# Patient Record
Sex: FEMALE | Race: WHITE | NOT HISPANIC OR LATINO | Employment: PART TIME | URBAN - NONMETROPOLITAN AREA
[De-identification: names, ages, dates, MRNs, and addresses within clinical notes are randomized per-mention and may not be internally consistent; named-entity substitution may affect disease eponyms.]

---

## 2022-01-21 ENCOUNTER — OFFICE VISIT (OUTPATIENT)
Dept: INTERNAL MEDICINE | Facility: CLINIC | Age: 27
End: 2022-01-21

## 2022-01-21 VITALS
HEIGHT: 62 IN | BODY MASS INDEX: 36.29 KG/M2 | SYSTOLIC BLOOD PRESSURE: 120 MMHG | HEART RATE: 107 BPM | RESPIRATION RATE: 16 BRPM | OXYGEN SATURATION: 96 % | DIASTOLIC BLOOD PRESSURE: 82 MMHG | WEIGHT: 197.2 LBS | TEMPERATURE: 97 F

## 2022-01-21 DIAGNOSIS — F32.A ANXIETY AND DEPRESSION: ICD-10-CM

## 2022-01-21 DIAGNOSIS — R63.5 WEIGHT GAIN, ABNORMAL: Primary | ICD-10-CM

## 2022-01-21 DIAGNOSIS — Z30.41 VISIT FOR BIRTH CONTROL PILLS MAINTENANCE: ICD-10-CM

## 2022-01-21 DIAGNOSIS — Z11.59 NEED FOR HEPATITIS C SCREENING TEST: ICD-10-CM

## 2022-01-21 DIAGNOSIS — F41.9 ANXIETY AND DEPRESSION: ICD-10-CM

## 2022-01-21 DIAGNOSIS — R53.83 FATIGUE, UNSPECIFIED TYPE: ICD-10-CM

## 2022-01-21 PROCEDURE — 99204 OFFICE O/P NEW MOD 45 MIN: CPT | Performed by: FAMILY MEDICINE

## 2022-01-21 RX ORDER — MULTIPLE VITAMINS W/ MINERALS TAB 9MG-400MCG
1 TAB ORAL DAILY
COMMUNITY

## 2022-01-21 RX ORDER — BUPROPION HYDROCHLORIDE 150 MG/1
150 TABLET ORAL EVERY MORNING
Qty: 90 TABLET | Refills: 3 | Status: SHIPPED | OUTPATIENT
Start: 2022-01-21

## 2022-01-21 RX ORDER — NORGESTIMATE AND ETHINYL ESTRADIOL 7DAYSX3 28
1 KIT ORAL DAILY
Qty: 84 TABLET | Refills: 3 | Status: SHIPPED | OUTPATIENT
Start: 2022-01-21

## 2022-01-21 NOTE — PROGRESS NOTES
"Subjective    Shaniqua Oliveira is a 26 y.o. female here for:  Chief Complaint   Patient presents with   • Thyroid Problem     establish care, discuss getting thyroid labs   • Contraception     pt is currently on the pill, for about 10 years       History per MA reviewed.    Thyroid issues?  Something \"off\" x last year  Gaining weight, not doing anything different  Estimates about 30 lb   Has stretch marks and those are now  Thighs, arms, sides  Always tired  In grad school  No libido  Wasn't like that about a year ago.   On oral contraceptive pill for 10 years  Regular periods, not too heavy    All through college was 130-140 lb.     Anxiety and depression  Was on Trintellix which helped with depression, not so much her anxiety  Has tried/failed: Zoloft, Paxil, Lexapro, prozac         The following portions of the patient's history were reviewed and updated as appropriate: allergies, current medications, past family history, past medical history, past social history, past surgical history and problem list.    Review of Systems   Constitutional: Positive for fatigue and unexpected weight gain.   Eyes:        Dry eyes   Musculoskeletal: Positive for arthralgias.   Skin: Positive for skin lesions (stretch marks on thighs, flank, arms).   Psychiatric/Behavioral: Negative for suicidal ideas.        Sleep disturbances, anxiety, depressed, change in personality, sadness.     All other systems reviewed and are negative.        Objective   Visit Vitals  /82 (BP Location: Right arm, Patient Position: Sitting, Cuff Size: Adult)   Pulse 107   Temp 97 °F (36.1 °C) (Temporal)   Resp 16   Ht 157.5 cm (62\")   Wt 89.4 kg (197 lb 3.2 oz)   SpO2 96%   BMI 36.07 kg/m²       Physical Exam  Vitals and nursing note reviewed.   Constitutional:       General: She is not in acute distress.     Appearance: Normal appearance. She is well-developed and well-groomed. She is obese. She is not ill-appearing, toxic-appearing or diaphoretic.      " Interventions: Face mask in place.   HENT:      Head: Normocephalic and atraumatic.      Right Ear: Hearing normal.      Left Ear: Hearing normal.   Eyes:      General: Lids are normal. No scleral icterus.     Extraocular Movements: Extraocular movements intact.   Neck:      Trachea: Phonation normal.   Pulmonary:      Effort: Pulmonary effort is normal.   Musculoskeletal:      Cervical back: Neck supple.   Skin:     Coloration: Skin is not jaundiced or pale.      Comments: Stretch marks on right flank   Neurological:      General: No focal deficit present.      Mental Status: She is alert and oriented to person, place, and time.      Motor: Motor function is intact.   Psychiatric:         Attention and Perception: Attention and perception normal.         Mood and Affect: Mood and affect normal.         Speech: Speech normal.         Behavior: Behavior normal. Behavior is cooperative.         Thought Content: Thought content normal.         Cognition and Memory: Cognition and memory normal.         Judgment: Judgment normal.         Assessment/Plan     Problem List Items Addressed This Visit        Mental Health    Anxiety and depression    Overview     Has tried/failed: Zoloft, Paxil, Lexapro, prozac         Relevant Medications    buPROPion XL (Wellbutrin XL) 150 MG 24 hr tablet      Other Visit Diagnoses     Weight gain, abnormal    -  Primary    Relevant Orders    TSH+Free T4    Triiodothyronine (T3) Total & Free    T4    Thyroid Antibodies    ACTH    Cortisol - AM    CBC (No Diff)    Comprehensive Metabolic Panel    Hemoglobin A1c    Vitamin B12    Folate    Vitamin D 25 Hydroxy    Fatigue, unspecified type        Relevant Orders    TSH+Free T4    Triiodothyronine (T3) Total & Free    T4    Thyroid Antibodies    ACTH    Cortisol - AM    CBC (No Diff)    Comprehensive Metabolic Panel    Hemoglobin A1c    Vitamin B12    Folate    Vitamin D 25 Hydroxy    Need for hepatitis C screening test        Relevant Orders     Hepatitis C Antibody    Visit for birth control pills maintenance        Relevant Medications    norgestimate-ethinyl estradiol (Tri-Sprintec) 0.18/0.215/0.25 MG-35 MCG per tablet          · Trial Wellbutrin for anxiety/depression, may also help with focus and/or weight loss. Stop if any si/hi    Return in about 4 weeks (around 2/18/2022) for follow up.     Telma Mcbride MD

## 2022-01-24 DIAGNOSIS — R79.9 ABNORMAL BLOOD CHEMISTRY: Primary | ICD-10-CM

## 2022-01-24 LAB
25(OH)D3+25(OH)D2 SERPL-MCNC: 29.3 NG/ML (ref 30–100)
ACTH PLAS-MCNC: 7.8 PG/ML (ref 7.2–63.3)
ALBUMIN SERPL-MCNC: 4.5 G/DL (ref 3.9–5)
ALBUMIN/GLOB SERPL: 1.4 {RATIO} (ref 1.2–2.2)
ALP SERPL-CCNC: 72 IU/L (ref 44–121)
ALT SERPL-CCNC: 15 IU/L (ref 0–32)
AST SERPL-CCNC: 13 IU/L (ref 0–40)
BILIRUB SERPL-MCNC: 0.2 MG/DL (ref 0–1.2)
BUN SERPL-MCNC: 14 MG/DL (ref 6–20)
BUN/CREAT SERPL: 22 (ref 9–23)
CALCIUM SERPL-MCNC: 9.4 MG/DL (ref 8.7–10.2)
CHLORIDE SERPL-SCNC: 102 MMOL/L (ref 96–106)
CO2 SERPL-SCNC: 24 MMOL/L (ref 20–29)
CORTIS AM PEAK SERPL-MCNC: 19.8 UG/DL (ref 6.2–19.4)
CREAT SERPL-MCNC: 0.64 MG/DL (ref 0.57–1)
ERYTHROCYTE [DISTWIDTH] IN BLOOD BY AUTOMATED COUNT: 12.7 % (ref 11.7–15.4)
FOLATE SERPL-MCNC: 18.7 NG/ML
GLOBULIN SER CALC-MCNC: 3.2 G/DL (ref 1.5–4.5)
GLUCOSE SERPL-MCNC: 77 MG/DL (ref 65–99)
HBA1C MFR BLD: 5.1 % (ref 4.8–5.6)
HCT VFR BLD AUTO: 40.7 % (ref 34–46.6)
HCV AB S/CO SERPL IA: <0.1 S/CO RATIO (ref 0–0.9)
HGB BLD-MCNC: 14.1 G/DL (ref 11.1–15.9)
MCH RBC QN AUTO: 30.5 PG (ref 26.6–33)
MCHC RBC AUTO-ENTMCNC: 34.6 G/DL (ref 31.5–35.7)
MCV RBC AUTO: 88 FL (ref 79–97)
PLATELET # BLD AUTO: 467 X10E3/UL (ref 150–450)
POTASSIUM SERPL-SCNC: 4.2 MMOL/L (ref 3.5–5.2)
PROT SERPL-MCNC: 7.7 G/DL (ref 6–8.5)
RBC # BLD AUTO: 4.63 X10E6/UL (ref 3.77–5.28)
SODIUM SERPL-SCNC: 140 MMOL/L (ref 134–144)
T3 SERPL-MCNC: 165 NG/DL (ref 71–180)
T3FREE SERPL-MCNC: 3.2 PG/ML (ref 2–4.4)
T4 FREE SERPL-MCNC: 0.97 NG/DL (ref 0.82–1.77)
T4 SERPL-MCNC: 9.8 UG/DL (ref 4.5–12)
THYROGLOB AB SERPL-ACNC: <1 IU/ML (ref 0–0.9)
THYROPEROXIDASE AB SERPL-ACNC: 9 IU/ML (ref 0–34)
TSH SERPL-ACNC: 1.46 UIU/ML (ref 0.45–4.5)
VIT B12 SERPL-MCNC: 401 PG/ML (ref 232–1245)
WBC # BLD AUTO: 11 X10E3/UL (ref 3.4–10.8)

## 2023-04-12 ENCOUNTER — LAB (OUTPATIENT)
Dept: LAB | Facility: HOSPITAL | Age: 28
End: 2023-04-12
Payer: MEDICAID

## 2023-04-12 ENCOUNTER — OFFICE VISIT (OUTPATIENT)
Dept: INTERNAL MEDICINE | Facility: CLINIC | Age: 28
End: 2023-04-12
Payer: MEDICAID

## 2023-04-12 VITALS
WEIGHT: 191 LBS | DIASTOLIC BLOOD PRESSURE: 82 MMHG | HEART RATE: 89 BPM | BODY MASS INDEX: 35.15 KG/M2 | SYSTOLIC BLOOD PRESSURE: 118 MMHG | OXYGEN SATURATION: 99 % | HEIGHT: 62 IN

## 2023-04-12 DIAGNOSIS — F41.9 ANXIETY: ICD-10-CM

## 2023-04-12 DIAGNOSIS — Z71.3 ENCOUNTER FOR DIETARY COUNSELING AND SURVEILLANCE: ICD-10-CM

## 2023-04-12 DIAGNOSIS — Z76.89 ESTABLISHING CARE WITH NEW DOCTOR, ENCOUNTER FOR: ICD-10-CM

## 2023-04-12 DIAGNOSIS — Z13.220 SCREENING FOR LIPID DISORDERS: ICD-10-CM

## 2023-04-12 DIAGNOSIS — Z23 NEED FOR VACCINATION: ICD-10-CM

## 2023-04-12 DIAGNOSIS — Z01.82 ENCOUNTER FOR ALLERGY TESTING: ICD-10-CM

## 2023-04-12 DIAGNOSIS — E66.09 CLASS 1 OBESITY DUE TO EXCESS CALORIES WITHOUT SERIOUS COMORBIDITY WITH BODY MASS INDEX (BMI) OF 34.0 TO 34.9 IN ADULT: ICD-10-CM

## 2023-04-12 DIAGNOSIS — R14.0 BLOATING: ICD-10-CM

## 2023-04-12 DIAGNOSIS — L70.8 OTHER ACNE: ICD-10-CM

## 2023-04-12 DIAGNOSIS — G47.9 TROUBLE IN SLEEPING: ICD-10-CM

## 2023-04-12 DIAGNOSIS — Z76.89 ESTABLISHING CARE WITH NEW DOCTOR, ENCOUNTER FOR: Primary | ICD-10-CM

## 2023-04-12 DIAGNOSIS — J34.2 DEVIATED SEPTUM: ICD-10-CM

## 2023-04-12 DIAGNOSIS — Z13.29 SCREENING FOR THYROID DISORDER: ICD-10-CM

## 2023-04-12 DIAGNOSIS — Z13.1 SCREENING FOR DIABETES MELLITUS: ICD-10-CM

## 2023-04-12 DIAGNOSIS — Z83.3 FAMILY HISTORY OF DIABETES MELLITUS: ICD-10-CM

## 2023-04-12 DIAGNOSIS — Z01.419 ENCOUNTER FOR WELL WOMAN EXAM: ICD-10-CM

## 2023-04-12 DIAGNOSIS — Z82.61 FAMILY HISTORY OF RHEUMATOID ARTHRITIS: ICD-10-CM

## 2023-04-12 LAB
ALBUMIN SERPL-MCNC: 4.3 G/DL (ref 3.5–5.2)
ALBUMIN/GLOB SERPL: 1.3 G/DL
ALP SERPL-CCNC: 68 U/L (ref 39–117)
ALT SERPL W P-5'-P-CCNC: 9 U/L (ref 1–33)
ANION GAP SERPL CALCULATED.3IONS-SCNC: 10 MMOL/L (ref 5–15)
AST SERPL-CCNC: 16 U/L (ref 1–32)
BILIRUB SERPL-MCNC: 0.2 MG/DL (ref 0–1.2)
BILIRUB UR QL STRIP: NEGATIVE
BUN SERPL-MCNC: 10 MG/DL (ref 6–20)
BUN/CREAT SERPL: 14.1 (ref 7–25)
CALCIUM SPEC-SCNC: 9.3 MG/DL (ref 8.6–10.5)
CHLORIDE SERPL-SCNC: 104 MMOL/L (ref 98–107)
CHOLEST SERPL-MCNC: 175 MG/DL (ref 0–200)
CLARITY UR: ABNORMAL
CO2 SERPL-SCNC: 24 MMOL/L (ref 22–29)
COLOR UR: YELLOW
CREAT SERPL-MCNC: 0.71 MG/DL (ref 0.57–1)
DEPRECATED RDW RBC AUTO: 38.1 FL (ref 37–54)
EGFRCR SERPLBLD CKD-EPI 2021: 121.2 ML/MIN/1.73
ERYTHROCYTE [DISTWIDTH] IN BLOOD BY AUTOMATED COUNT: 12.2 % (ref 12.3–15.4)
GLOBULIN UR ELPH-MCNC: 3.4 GM/DL
GLUCOSE SERPL-MCNC: 64 MG/DL (ref 65–99)
GLUCOSE UR STRIP-MCNC: NEGATIVE MG/DL
HBA1C MFR BLD: 5.6 % (ref 4.8–5.6)
HCT VFR BLD AUTO: 38.6 % (ref 34–46.6)
HDLC SERPL-MCNC: 58 MG/DL (ref 40–60)
HGB BLD-MCNC: 13.6 G/DL (ref 12–15.9)
HGB UR QL STRIP.AUTO: NEGATIVE
KETONES UR QL STRIP: ABNORMAL
LDLC SERPL CALC-MCNC: 98 MG/DL (ref 0–100)
LDLC/HDLC SERPL: 1.64 {RATIO}
LEUKOCYTE ESTERASE UR QL STRIP.AUTO: NEGATIVE
MCH RBC QN AUTO: 30.3 PG (ref 26.6–33)
MCHC RBC AUTO-ENTMCNC: 35.2 G/DL (ref 31.5–35.7)
MCV RBC AUTO: 86 FL (ref 79–97)
NITRITE UR QL STRIP: NEGATIVE
PH UR STRIP.AUTO: 6 [PH] (ref 5–8)
PLATELET # BLD AUTO: 372 10*3/MM3 (ref 140–450)
PMV BLD AUTO: 10.2 FL (ref 6–12)
POTASSIUM SERPL-SCNC: 3.9 MMOL/L (ref 3.5–5.2)
PROT SERPL-MCNC: 7.7 G/DL (ref 6–8.5)
PROT UR QL STRIP: ABNORMAL
RBC # BLD AUTO: 4.49 10*6/MM3 (ref 3.77–5.28)
SODIUM SERPL-SCNC: 138 MMOL/L (ref 136–145)
SP GR UR STRIP: 1.03 (ref 1–1.03)
TRIGL SERPL-MCNC: 108 MG/DL (ref 0–150)
TSH SERPL DL<=0.05 MIU/L-ACNC: 2.04 UIU/ML (ref 0.27–4.2)
UROBILINOGEN UR QL STRIP: ABNORMAL
VLDLC SERPL-MCNC: 19 MG/DL (ref 5–40)
WBC NRBC COR # BLD: 9.14 10*3/MM3 (ref 3.4–10.8)

## 2023-04-12 PROCEDURE — 86003 ALLG SPEC IGE CRUDE XTRC EA: CPT

## 2023-04-12 PROCEDURE — 80061 LIPID PANEL: CPT

## 2023-04-12 PROCEDURE — 36415 COLL VENOUS BLD VENIPUNCTURE: CPT

## 2023-04-12 PROCEDURE — 83036 HEMOGLOBIN GLYCOSYLATED A1C: CPT

## 2023-04-12 PROCEDURE — 80050 GENERAL HEALTH PANEL: CPT

## 2023-04-12 PROCEDURE — 81003 URINALYSIS AUTO W/O SCOPE: CPT

## 2023-04-12 RX ORDER — HYDROXYZINE HYDROCHLORIDE 10 MG/1
10 TABLET, FILM COATED ORAL 3 TIMES DAILY PRN
Qty: 90 TABLET | Refills: 0 | Status: SHIPPED | OUTPATIENT
Start: 2023-04-12 | End: 2023-05-12

## 2023-04-12 RX ORDER — BUSPIRONE HYDROCHLORIDE 5 MG/1
5 TABLET ORAL 3 TIMES DAILY
Qty: 90 TABLET | Refills: 0 | Status: SHIPPED | OUTPATIENT
Start: 2023-04-12 | End: 2023-05-12

## 2023-04-12 NOTE — PROGRESS NOTES
"    Office Note     Name: Shaniqua Oliveira    : 1995     MRN: [id number]     Chief Complaint  Establish Care and referrals     Subjective     History of Present Illness:  Shaniqua Oliveira is a 27 y.o. female who presents today for establish care with new provider    Patient presents today requesting a number of referrals as well as concern for anxiety.    Dermatology: Patient thought she was over acne but she is continuing to have breakouts.  This is mainly on her face.  She denies any breakouts on her body.  She is currently in grad school and does understand that she is probably not eating the best diet or exercising regularly and does understand this is all connected.  She would appreciate a referral  Allergy: Patient is requesting a referral to allergy for testing.  She feels she could possibly have a allergy to either dairy or gluten.  She continues to feel bloated especially after eating those 2 items.  She does consistently burp.  She will also occasionally feel nauseated after eating those items as well.  ENT: Patient states that she had a COVID test completed at a different location and was told she had a deviated septum.  She does note snoring as well as nasal congestion.  She has never been told she has a deviated septum.  She would like this further evaluated  Women's health: Patient would appreciate a referral to women's health for Pap smears.    Anxiety: Patient states that she does have \"bad anxiety.  She also does not sleep very well.  She does understand that she is in grad school and probably not as healthy as she should be.  She reports feeling very anxious.  No suicidal or homicidal ideation.  She has tried Wellbutrin in the past but it has increased her anxiety.  She plateaued on Trintellix.  She did have a bad reaction to Zoloft and also had side effects to Paxil.    Patient requested fourth COVID vaccination    Patient is a non-smoker.  No excessive alcohol intake.  No drug use.    Patient " describes her diet is unhealthy.  Denies any particular exercise pattern    Vaccines:  Flu shot in 2022  Patient has had 3 COVID vaccines and requested her fourth today  Patient did have an injury last year in July and did receive a tetanus shot.  This was documented today  She has had 2 of the 3 HPV vaccines    Patient states it has been quite a long time since her last Pap smear    Family history includes a mother with RA on Remicade.  Diabetes in her dad and sister.  There is noted issues with the eyes and multiple family members    Patient does prefer morning appointments on Mondays or Fridays    Review of Systems   Constitutional: Negative for fatigue and fever.        Establish care   Respiratory: Negative for cough.    Cardiovascular: Negative for chest pain.   Musculoskeletal: Negative for arthralgias.   Allergic/Immunologic: Negative for immunocompromised state.   Neurological: Negative for headaches.   Psychiatric/Behavioral: Negative for suicidal ideas. The patient is not nervous/anxious.        Past Medical History:   Diagnosis Date   • Anxiety 2019   • Depression 2013   • Irritable bowel syndrome 2012       Past Surgical History:   Procedure Laterality Date   • COLONOSCOPY  2011       Social History     Socioeconomic History   • Marital status: Single   Tobacco Use   • Smoking status: Never   • Smokeless tobacco: Never   Vaping Use   • Vaping Use: Never used   Substance and Sexual Activity   • Alcohol use: Not Currently   • Drug use: Never   • Sexual activity: Not Currently     Partners: Male     Birth control/protection: Condom, Pill     Comment: Oral birth control         Current Outpatient Medications:   •  norgestimate-ethinyl estradiol (Tri-Sprintec) 0.18/0.215/0.25 MG-35 MCG per tablet, Take 1 tablet by mouth Daily., Disp: 84 tablet, Rfl: 3  •  busPIRone (BUSPAR) 5 MG tablet, Take 1 tablet by mouth 3 (Three) Times a Day for 30 days., Disp: 90 tablet, Rfl: 0  •  hydrOXYzine (ATARAX) 10 MG tablet,  "Take 1 tablet by mouth 3 (Three) Times a Day As Needed for Itching for up to 30 days., Disp: 90 tablet, Rfl: 0    Objective     Vital Signs  /82   Pulse 89   Ht 157.5 cm (62\")   Wt 86.6 kg (191 lb)   SpO2 99%   BMI 34.93 kg/m²   Estimated body mass index is 34.93 kg/m² as calculated from the following:    Height as of this encounter: 157.5 cm (62\").    Weight as of this encounter: 86.6 kg (191 lb).    BMI is >= 30 and <35. (Class 1 Obesity). The following options were offered after discussion;: exercise counseling/recommendations and nutrition counseling/recommendations      PHQ-9 Depression Screening  Little interest or pleasure in doing things? 0-->not at all   Feeling down, depressed, or hopeless? 0-->not at all   Trouble falling or staying asleep, or sleeping too much?     Feeling tired or having little energy?     Poor appetite or overeating?     Feeling bad about yourself - or that you are a failure or have let yourself or your family down?     Trouble concentrating on things, such as reading the newspaper or watching television?     Moving or speaking so slowly that other people could have noticed? Or the opposite - being so fidgety or restless that you have been moving around a lot more than usual?     Thoughts that you would be better off dead, or of hurting yourself in some way?     PHQ-9 Total Score 0   If you checked off any problems, how difficult have these problems made it for you to do your work, take care of things at home, or get along with other people?       PHQ-9 Total Score: 0         Physical Exam  Vitals and nursing note reviewed.   Constitutional:       General: She is awake.      Appearance: Normal appearance. She is well-groomed. She is obese.   HENT:      Head: Normocephalic and atraumatic.      Right Ear: Hearing and external ear normal.      Left Ear: Hearing and external ear normal.      Nose: Septal deviation present.      Mouth/Throat:      Lips: Pink.      Mouth: Mucous " membranes are moist.   Eyes:      Extraocular Movements: Extraocular movements intact.      Pupils: Pupils are equal, round, and reactive to light.   Cardiovascular:      Rate and Rhythm: Normal rate and regular rhythm.      Pulses: Normal pulses.           Radial pulses are 2+ on the right side and 2+ on the left side.        Posterior tibial pulses are 2+ on the right side and 2+ on the left side.      Heart sounds: Normal heart sounds, S1 normal and S2 normal.   Pulmonary:      Effort: Pulmonary effort is normal.      Breath sounds: Normal breath sounds.   Abdominal:      General: Bowel sounds are normal.      Palpations: Abdomen is soft.   Musculoskeletal:         General: Normal range of motion.      Cervical back: Normal range of motion.   Skin:     General: Skin is warm and dry.   Neurological:      Mental Status: She is alert and oriented to person, place, and time.   Psychiatric:         Mood and Affect: Mood normal.         Behavior: Behavior normal. Behavior is cooperative.         Thought Content: Thought content normal.         Judgment: Judgment normal.               Assessment and Plan     Diagnoses and all orders for this visit:    1. Establishing care with new doctor, encounter for (Primary)  -     CBC (No Diff); Future  -     Comprehensive Metabolic Panel; Future  -     Urinalysis With Culture If Indicated -; Future    2. Other acne  -     Ambulatory Referral to Dermatology    3. Bloating  -     Allergen Food Panel; Future  -     Allergens (33) Environmental; Future  -     Ambulatory Referral to Allergy    4. Encounter for allergy testing  -     Allergen Food Panel; Future  -     Allergens (33) Environmental; Future  -     Ambulatory Referral to Allergy    5. Deviated septum  -     Ambulatory Referral to ENT (Otolaryngology)    6. Encounter for well woman exam  -     Ambulatory Referral to Obstetrics / Gynecology    7. Anxiety  -     busPIRone (BUSPAR) 5 MG tablet; Take 1 tablet by mouth 3 (Three)  Times a Day for 30 days.  Dispense: 90 tablet; Refill: 0    8. Trouble in sleeping  -     hydrOXYzine (ATARAX) 10 MG tablet; Take 1 tablet by mouth 3 (Three) Times a Day As Needed for Itching for up to 30 days.  Dispense: 90 tablet; Refill: 0    9. Need for vaccination  -     COVID-19 Bivalent Booster (Pfizer) 12+yrs    10. Screening for lipid disorders  -     Lipid Panel; Future    11. Screening for thyroid disorder  -     TSH Rfx On Abnormal To Free T4; Future    12. Screening for diabetes mellitus  -     Hemoglobin A1c; Future    13. Family history of rheumatoid arthritis    14. Family history of diabetes mellitus    15. Class 1 obesity due to excess calories without serious comorbidity with body mass index (BMI) of 34.0 to 34.9 in adult    16. Encounter for dietary counseling and surveillance    Plan  Very pleasant visit with patient today    Referral placed to dermatology regarding issues with acne on her face    Referral placed to allergy regarding concern for possible dairy or gluten allergy    Referral placed to ENT regarding deviated septum    Referral placed to women's health for updated Pap smears    Labs were ordered and will be obtained today.  Patient will be notified of results.  Patient is aware that I will be out of town next week so she does have additional questions regarding labs we will plan to follow-up at her next visit    Anxiety and trouble sleeping: Patient will be started on BuSpar at 5 mg.  She will start by taking this once daily for about 5 to 7 days.  She will increase to twice daily after that.  If she does feel that she needs to take it 3 times a day she is welcome to.  She will start taking hydroxyzine at night to help with sleep.  We did discuss this can also be taken during the day up to 3 times per day.  We did discuss side effects of each medication.    Fourth COVID-vaccine was provided today    Patient will consider getting the third HPV vaccination at next appointment    Go to  ER if any condition worsens or severe    Consider starting on a healthy diet and exercise pattern    Plan to follow-up in 4 weeks for annual visit, lab review, referral review, consideration of HPV vaccination.  Patient does prefer morning appointments on Monday or Friday    Follow Up  Return for annnual visit and lab review in 4 weeks- 45 minutes- morning appt, mon or fri.    NICHOLAS Gaona    Part of this note may be an electronic transcription/translation of spoken language to printed text using the Dragon Dictation System.

## 2023-04-13 ENCOUNTER — TELEPHONE (OUTPATIENT)
Dept: INTERNAL MEDICINE | Facility: CLINIC | Age: 28
End: 2023-04-13
Payer: MEDICAID

## 2023-04-13 NOTE — TELEPHONE ENCOUNTER
----- Message from NICHOLAS Gaona sent at 4/13/2023  8:23 AM EDT -----  Please let patient know labs resulted.  We are still waiting on the allergen testing to return.  I know I told her yesterday that could take some time.  If it does result while I am out of town next week you are welcome to review as it has a fairly good layout of how to interpret.  Liver numbers, kidney numbers, electrolytes are within normal limits.  Your blood sugar was slightly low on lab work.  Urinalysis with minor abnormalities.  No evidence of infection or anemia  Thyroid within normal limits  Lipid panel looks great  A1c is 5.6.  No evidence of prediabetes or diabetes

## 2023-04-15 LAB
A ALTERNATA IGE QN: <0.1 KU/L
A FUMIGATUS IGE QN: <0.1 KU/L
AMER ROACH IGE QN: <0.1 KU/L
BERMUDA GRASS IGE QN: <0.1 KU/L
BOXELDER IGE QN: <0.1 KU/L
C ALBICANS IGE QN: <0.1 KU/L
C HERBARUM IGE QN: <0.1 KU/L
C SPECIFERA IGE QN: <0.1 KU/L
CARELESS WEED IGE QN: <0.1 KU/L
CAT DANDER IGE QN: 4.57 KU/L
COCKLEBUR IGE QN: <0.1 KU/L
COMMON RAGWEED IGE QN: 0.39 KU/L
CONV CLASS DESCRIPTION: ABNORMAL
COW DANDER IGE QN: <0.1 KU/L
D FARINAE IGE QN: <0.1 KU/L
D PTERONYSS IGE QN: <0.1 KU/L
DOG DANDER IGE QN: 0.24 KU/L
EAST WHITE PINE IGE QN: <0.1 KU/L
ENGL PLANTAIN IGE QN: 4.63 KU/L
GOOSE FEATHER IGE QN: <0.1 KU/L
GOOSEFOOT IGE QN: <0.1 KU/L
HORSE EPITH IGE QN: <0.1 KU/L
HOUSE DUST HS IGE QN: 0.41 KU/L
JOHNSON GRASS IGE QN: 0.31 KU/L
KENT BLUE GRASS IGE QN: 0.88 KU/L
LONDON PLANE IGE QN: <0.1 KU/L
MT JUNIPER IGE QN: 14.3 KU/L
P NOTATUM IGE QN: <0.1 KU/L
S ROSTRATA IGE QN: <0.1 KU/L
SHEEP SORREL IGE QN: <0.1 KU/L
VIRG LIVE OAK IGE QN: <0.1 KU/L
WHITE ASH IGE QN: 0.61 KU/L
WHITE ELM IGE QN: <0.1 KU/L
WHITE HICKORY IGE QN: 0.13 KU/L

## 2023-04-16 LAB
BARLEY IGE QN: <0.1 KU/L
BEEF IGE QN: <0.1 KU/L
CABBAGE IGE QN: <0.1 KU/L
CARROT IGE QN: <0.1 KU/L
CHICKEN MEAT IGE QN: <0.1 KU/L
CODFISH IGE QN: <0.1 KU/L
CONV CLASS DESCRIPTION: NORMAL
CORN IGE QN: <0.1 KU/L
COW MILK IGE QN: <0.1 KU/L
CRAB IGE QN: <0.1 KU/L
EGG WHITE IGE QN: <0.1 KU/L
GRAPE IGE QN: <0.1 KU/L
GREEN PEPPER IGE QN: <0.1 KU/L
LETTUCE IGE QN: <0.1 KU/L
OAT IGE QN: <0.1 KU/L
ORANGE IGE QN: <0.1 KU/L
PEANUT IGE QN: <0.1 KU/L
PORK IGE QN: <0.1 KU/L
POTATO IGE QN: <0.1 KU/L
RICE IGE QN: <0.1 KU/L
RYE IGE QN: <0.1 KU/L
SHRIMP IGE QN: <0.1 KU/L
SOYBEAN IGE QN: <0.1 KU/L
TOMATO IGE QN: <0.1 KU/L
TUNA IGE QN: <0.1 KU/L
WHEAT IGE QN: <0.1 KU/L
WHITE BEAN IGE QN: <0.1 KU/L

## 2023-04-20 ENCOUNTER — TELEPHONE (OUTPATIENT)
Dept: INTERNAL MEDICINE | Facility: CLINIC | Age: 28
End: 2023-04-20
Payer: MEDICAID

## 2023-04-20 NOTE — TELEPHONE ENCOUNTER
----- Message from NICHOLAS Salazar sent at 4/19/2023  8:31 PM EDT -----  Your allergy testing is now resulted. Follow up with allergy specialist as planned. Let us know if you have any specific questions/ concerns before that time.

## 2023-05-04 ENCOUNTER — OFFICE VISIT (OUTPATIENT)
Dept: INTERNAL MEDICINE | Facility: CLINIC | Age: 28
End: 2023-05-04
Payer: MEDICAID

## 2023-05-04 VITALS
HEIGHT: 62 IN | DIASTOLIC BLOOD PRESSURE: 78 MMHG | WEIGHT: 192 LBS | SYSTOLIC BLOOD PRESSURE: 124 MMHG | OXYGEN SATURATION: 99 % | BODY MASS INDEX: 35.33 KG/M2 | HEART RATE: 98 BPM

## 2023-05-04 DIAGNOSIS — Z71.3 ENCOUNTER FOR DIETARY COUNSELING AND SURVEILLANCE: ICD-10-CM

## 2023-05-04 DIAGNOSIS — F32.A ANXIETY AND DEPRESSION: ICD-10-CM

## 2023-05-04 DIAGNOSIS — G47.9 TROUBLE IN SLEEPING: ICD-10-CM

## 2023-05-04 DIAGNOSIS — E66.09 CLASS 2 OBESITY DUE TO EXCESS CALORIES WITHOUT SERIOUS COMORBIDITY WITH BODY MASS INDEX (BMI) OF 35.0 TO 35.9 IN ADULT: ICD-10-CM

## 2023-05-04 DIAGNOSIS — Z00.00 ENCOUNTER FOR WELL ADULT EXAM WITHOUT ABNORMAL FINDINGS: ICD-10-CM

## 2023-05-04 DIAGNOSIS — L70.8 OTHER ACNE: ICD-10-CM

## 2023-05-04 DIAGNOSIS — Z00.00 ANNUAL PHYSICAL EXAM: Primary | ICD-10-CM

## 2023-05-04 DIAGNOSIS — Z83.3 FAMILY HISTORY OF DIABETES MELLITUS: ICD-10-CM

## 2023-05-04 DIAGNOSIS — J34.2 DEVIATED SEPTUM: ICD-10-CM

## 2023-05-04 DIAGNOSIS — K58.8 OTHER IRRITABLE BOWEL SYNDROME: ICD-10-CM

## 2023-05-04 DIAGNOSIS — Z30.41 VISIT FOR BIRTH CONTROL PILLS MAINTENANCE: ICD-10-CM

## 2023-05-04 DIAGNOSIS — Z78.9 NON-SMOKER: ICD-10-CM

## 2023-05-04 DIAGNOSIS — F41.9 ANXIETY AND DEPRESSION: ICD-10-CM

## 2023-05-04 DIAGNOSIS — Z82.0 FAMILY HISTORY OF MEMORY LOSS: ICD-10-CM

## 2023-05-04 DIAGNOSIS — R14.0 BLOATING: ICD-10-CM

## 2023-05-04 RX ORDER — NORGESTIMATE AND ETHINYL ESTRADIOL 7DAYSX3 28
1 KIT ORAL DAILY
Qty: 84 TABLET | Refills: 0 | Status: SHIPPED | OUTPATIENT
Start: 2023-05-04

## 2023-05-04 RX ORDER — BUSPIRONE HYDROCHLORIDE 10 MG/1
10 TABLET ORAL 2 TIMES DAILY
Qty: 60 TABLET | Refills: 0 | Status: SHIPPED | OUTPATIENT
Start: 2023-05-04 | End: 2023-06-03

## 2023-05-04 NOTE — PROGRESS NOTES
Annual Physical     Name: Shaniqua Oliveira    : 1995     MRN: [id number]     Chief Complaint  Annual Exam    Subjective     History of Present Illness:  Shaniqua Oliveira is a 27 y.o. female who presents today for annual physical exam.    Anxiety and depression: At previous visit patient was started on BuSpar and hydroxyzine.  She states that hydroxyzine has been very helpful.  She has not slept this consistently in some time.  She reports this last month has been very stressful and she did see her therapist who recommended she consider starting on an SSRI.  She states she does have both a diagnosis of major depression and generalized anxiety disorder.  She feels like it is a mix between both.  Currently she is very anxious for school but she will have episodes of situational depression due to her mom's health issues.  She has tried Zoloft in the past and reports that side effects were intolerable.  She has tried Trintellix but felt she plateaued on this.  She has tried other medication but those were the 2 specifically that she mentioned.  No suicidal or homicidal ideation.  Depression screen completed at last visit    Patient does have upcoming appointments with referrals from previous visit  Dermatology is in July  The allergist will be next week  ENT was last week.  Patient was not very pleased with this visit as she felt the nurse and the doctor were very dismissive.  He gave her Flonase and an antibiotic cream to use for about 1 month.  She does have a follow-up in 1 month as well for consideration of further scanning.  She states that she is slightly stuck as this is the only office who will accept Medicaid.  [facility] [employer] is in August    No falls or ER visits in the last year    Patient is a non-smoker.  No excessive alcohol intake.  No drug use.    Patient does wear her seatbelts in the car and has smoke detectors at home    Significant family history does include a mother with vision issues and memory  "loss/beginning stages of dementia.  Father with type 2 diabetes and sister with type 1 diabetes    She is requesting a refill of her birth control prior to seeing women's health    Most recent lab work was also reviewed today    The patient is being seen for a health maintenance evaluation.    Past Medical History:   Diagnosis Date   • Anxiety 2019   • Depression 2013   • Irritable bowel syndrome 2012       Past Surgical History:   Procedure Laterality Date   • COLONOSCOPY  2011       Social History     Socioeconomic History   • Marital status: Single   Tobacco Use   • Smoking status: Never   • Smokeless tobacco: Never   Vaping Use   • Vaping Use: Never used   Substance and Sexual Activity   • Alcohol use: Not Currently   • Drug use: Never   • Sexual activity: Not Currently     Partners: Male     Birth control/protection: Condom, Pill     Comment: Oral birth control         Current Outpatient Medications:   •  hydrOXYzine (ATARAX) 10 MG tablet, Take 1 tablet by mouth 3 (Three) Times a Day As Needed for Itching for up to 30 days., Disp: 90 tablet, Rfl: 0  •  norgestimate-ethinyl estradiol (Tri-Sprintec) 0.18/0.215/0.25 MG-35 MCG per tablet, Take 1 tablet by mouth Daily., Disp: 84 tablet, Rfl: 0  •  busPIRone (BUSPAR) 10 MG tablet, Take 1 tablet by mouth 2 (Two) Times a Day for 30 days., Disp: 60 tablet, Rfl: 0    General History  Shaniqua  does have regular dental visits.  She does not complain of vision problems. Last eye exam was none recent.  Immunizations are up to date. The patient needs the following immunizations: Completion of HPV vaccination series will be completed today.  She has completed her COVID vaccination series.  Last tetanus shot was in 2022.    Lifestyle  Shaniqua  consumes in general, an \"unhealthy\" diet.  She exercises intermittently.    Reproductive Health  Shaniqua  is premenopausal.  She reports periods are regular every 28-30 days.  She is not sexually active. Her contraceptive plan is oral " contraceptives (estrogen/progesterone).    Screening  Last pap was -upcoming appointment with OB/GYN in August  Last Completed Pap Smear     This patient has no relevant Health Maintenance data.      . History of abnormal pap smear or family history of gyn cancer: None      Last mammogram was around age 23.  Patient does report she had breast lumps that were evaluated.  She denies any reported suggestions for follow-up  Last Completed Mammogram     This patient has no relevant Health Maintenance data.      . Personal or family history of abnormal mammograms or breast cancer: None stated    Last colonoscopy was a few years ago.  She was diagnosed with IBS when she was younger.  Denies any recommendations for follow-up  Last Completed Colonoscopy     This patient has no relevant Health Maintenance data.      . Family history of colon cancer: None stated    Last DEXA was never.    Advance Care Planning   ACP discussion was held with the patient during this visit. Patient does not have an advance directive, declines further assistance.       Health Maintenance Summary          Overdue - PAP SMEAR (Every 3 Years) Overdue - never done    No completion, postpone, or frequency change history exists for this topic.          INFLUENZA VACCINE (Yearly - August to March) Next due on 8/1/2023 12/04/2022  Imm Admin: Flu Vaccine Split Quad    12/04/2022  Imm Admin: Flublok 18+yrs    11/26/2021  Imm Admin: Flu Vaccine Quad PF >36MO    11/26/2021  Imm Admin: FluLaval/Fluzone >6mos    01/24/2019  Imm Admin: Flu Vaccine Quad PF >36MO    Only the first 5 history entries have been loaded, but more history exists.          ANNUAL PHYSICAL (Yearly) Next due on 5/4/2024 05/04/2023  Done          TDAP/TD VACCINES (3 - Td or Tdap) Next due on 7/14/2032 07/14/2022  Imm Admin: Tdap    04/06/2012  Imm Admin: Tdap          HEPATITIS C SCREENING  Completed    01/21/2022  Hep C Virus Ab component of Hepatitis C Antibody          COVID-19  "Vaccine (Series Information) Completed    04/12/2023  Imm Admin: COVID-19 (PFIZER) BIVALENT 12+YRS    11/26/2021  Imm Admin: COVID-19 (PFIZER) Purple Cap Monovalent    01/21/2021  Imm Admin: COVID-19 (PFIZER) Purple Cap Monovalent    01/01/2021  Imm Admin: COVID-19 (PFIZER) Purple Cap Monovalent          HPV VACCINES (Series Information) Completed    05/04/2023  Imm Admin: Hpv9    03/10/2021  Imm Admin: Hpv9    10/24/2020  Imm Admin: Hpv9          Pneumococcal Vaccine 0-64 (Series Information) Aged Out    No completion, postpone, or frequency change history exists for this topic.              Immunization History   Administered Date(s) Administered   • COVID-19 (PFIZER) BIVALENT 12+YRS 04/12/2023   • COVID-19 (PFIZER) Purple Cap Monovalent 01/01/2021, 01/21/2021, 11/26/2021   • DTP / HiB 1995, 01/26/1998, 10/01/1998   • DTaP, Unspecified 06/18/2002   • Flu Vaccine Quad PF >36MO 01/24/2019, 11/26/2021   • Flu Vaccine Split Quad 12/04/2022   • FluLaval/Fluzone >6mos 01/24/2019, 11/26/2021   • Flublok 18+yrs 12/04/2022   • Hep B, Adolescent or Pediatric 1995, 07/07/1998   • Hpv9 10/24/2020, 03/10/2021, 05/04/2023   • IPV 1995, 06/18/2002   • MCV4 Unspecified 06/01/2015   • MMR 10/01/1998, 06/18/2002   • Meningococcal MCV4P (Menactra) 06/01/2015   • OPV 10/01/1998   • Tdap 04/06/2012, 07/14/2022   • Varicella 06/18/2002, 06/01/2015       Review of Systems    Objective     Vital Signs  /78   Pulse 98   Ht 157.5 cm (62\")   Wt 87.1 kg (192 lb)   SpO2 99%   BMI 35.12 kg/m²   Estimated body mass index is 35.12 kg/m² as calculated from the following:    Height as of this encounter: 157.5 cm (62\").    Weight as of this encounter: 87.1 kg (192 lb).    Class 2 Severe Obesity (BMI >=35 and <=39.9). Obesity-related health conditions include the following: none. Obesity is unchanged. BMI is is above average; BMI management plan is completed. We discussed portion control and increasing exercise.       "     Physical Exam  Vitals and nursing note reviewed.   Constitutional:       Appearance: Normal appearance.   HENT:      Head: Normocephalic and atraumatic.   Eyes:      Extraocular Movements: Extraocular movements intact.      Pupils: Pupils are equal, round, and reactive to light.   Cardiovascular:      Rate and Rhythm: Normal rate and regular rhythm.      Pulses: Normal pulses.           Radial pulses are 2+ on the right side and 2+ on the left side.        Posterior tibial pulses are 2+ on the right side and 2+ on the left side.      Heart sounds: Normal heart sounds, S1 normal and S2 normal.   Pulmonary:      Effort: Pulmonary effort is normal.      Breath sounds: Normal breath sounds.   Abdominal:      General: Bowel sounds are normal.      Palpations: Abdomen is soft.   Musculoskeletal:         General: Normal range of motion.   Skin:     General: Skin is warm and dry.   Neurological:      Mental Status: She is alert and oriented to person, place, and time.   Psychiatric:         Mood and Affect: Mood normal.         Behavior: Behavior normal.         Thought Content: Thought content normal.         Judgment: Judgment normal.          Lab Review:   Latest Reference Range & Units 04/12/23 08:09   Glucose 65 - 99 mg/dL 64 (L)   Sodium 136 - 145 mmol/L 138   Potassium 3.5 - 5.2 mmol/L 3.9   CO2 22.0 - 29.0 mmol/L 24.0   Chloride 98 - 107 mmol/L 104   Anion Gap 5.0 - 15.0 mmol/L 10.0   Creatinine 0.57 - 1.00 mg/dL 0.71   BUN 6 - 20 mg/dL 10   BUN/Creatinine Ratio 7.0 - 25.0  14.1   Calcium 8.6 - 10.5 mg/dL 9.3   eGFR >60.0 mL/min/1.73 121.2   Alkaline Phosphatase 39 - 117 U/L 68   Total Protein 6.0 - 8.5 g/dL 7.7   ALT (SGPT) 1 - 33 U/L 9   AST (SGOT) 1 - 32 U/L 16   Total Bilirubin 0.0 - 1.2 mg/dL 0.2   Albumin 3.5 - 5.2 g/dL 4.3   Globulin gm/dL 3.4   A/G Ratio g/dL 1.3   Hemoglobin A1C 4.80 - 5.60 % 5.60   TSH Baseline 0.270 - 4.200 uIU/mL 2.040   Total Cholesterol 0 - 200 mg/dL 175   HDL Cholesterol 40 - 60  mg/dL 58   LDL Cholesterol  0 - 100 mg/dL 98   VLDL Cholesterol 5 - 40 mg/dL 19   Triglycerides 0 - 150 mg/dL 108   LDL/HDL Ratio  1.64   WBC 3.40 - 10.80 10*3/mm3 9.14   RBC 3.77 - 5.28 10*6/mm3 4.49   Hemoglobin 12.0 - 15.9 g/dL 13.6   Hematocrit 34.0 - 46.6 % 38.6   RDW 12.3 - 15.4 % 12.2 (L)   MCV 79.0 - 97.0 fL 86.0   MCH 26.6 - 33.0 pg 30.3   MCHC 31.5 - 35.7 g/dL 35.2   MPV 6.0 - 12.0 fL 10.2   Platelets 140 - 450 10*3/mm3 372   RDW-SD 37.0 - 54.0 fl 38.1   Yuri Grass Class 0/I kU/L 0.31 !   Color, UA Yellow, Straw  Yellow   Appearance, UA Clear  Hazy !   Specific Gravity, UA 1.005 - 1.030  1.026   pH, UA 5.0 - 8.0  6.0   Glucose Negative  Negative   Ketones, UA Negative  Trace !   Blood, UA Negative  Negative   Nitrite, UA Negative  Negative   Leukocytes, UA Negative  Negative   Protein, UA Negative  Trace !   Bilirubin, UA Negative  Negative   Urobilinogen, UA 0.2 - 1.0 E.U./dL  0.2 E.U./dL   Cat Dander Class IV kU/L 4.57 !   Cow Dander Class 0 kU/L <0.10   Dog Dander, IgE Class 0/I kU/L 0.24 !   Goose Feathers Class 0 kU/L <0.10   Horse Dander Class 0 kU/L <0.10   Barley Class 0 kU/L <0.10   Beef Class 0 kU/L <0.10   Cabbage Class 0 kU/L <0.10   Carrot Class 0 kU/L <0.10   Chicken Class 0 kU/L <0.10   Codfish Class 0 kU/L <0.10   Corn Class 0 kU/L <0.10   Crab Class 0 kU/L <0.10   Egg White Class 0 kU/L <0.10   English Plantain Class IV kU/L 4.63 !   Grape Class 0 kU/L <0.10   Green Bell Pepper Class 0 kU/L <0.10   Lettuce Class 0 kU/L <0.10   Milk, Cow's Class 0 kU/L <0.10   Oats Class 0 kU/L <0.10   Orange Class 0 kU/L <0.10   Peanut Class 0 kU/L <0.10   Pork Class 0 kU/L <0.10   Potato Class 0 kU/L <0.10   Rice Class 0 kU/L <0.10   Rye Class 0 kU/L <0.10   Shrimp Class 0 kU/L <0.10   Soybean Class 0 kU/L <0.10   Tomato Class 0 kU/L <0.10   Tuna Class 0 kU/L <0.10   Wheat Class 0 kU/L <0.10   White Bean Class 0 kU/L <0.10   Berm[place] Grass Class 0 kU/L <0.10   [facility] Blue[facility] IgE Class II kU/L 0.88 !    Sheep Sorrel Class 0 kU/L <0.10   Cockroach, American Class 0 kU/L <0.10   D. farinae (dust mite) Class 0 kU/L <0.10   D. pteronyssinus (dust mite) Class 0 kU/L <0.10   Class Description  Comment  Comment   Aspergillus fumigatus Class 0 kU/L <0.10   Bipolaris spicifera Class 0 kU/L <0.10   CANDIDA ALBICANS Class 0 kU/L <0.10   Cladosporium herbarum Class 0 kU/L <0.10   Setomelanomma rostrat Class 0 kU/L <0.10   Shakeel, White Class II kU/L 0.61 !   Coleman, Mountain Class IV kU/L 14.30 !   Elm, American Class 0 kU/L <0.10   Greenlee, White Class 0/I kU/L 0.13 !   Maple/Broome Class 0 kU/L <0.10   Maple [place] Class 0 kU/L <0.10   Braddock, Live/[place] Class 0 kU/L <0.10   [place], White Class 0 kU/L <0.10   Careless Weed Class 0 kU/L <0.10   Cocklebur Class 0 kU/L <0.10   Lamb's Quarter Class 0 kU/L <0.10   Ragweed, Common/Short Class I kU/L 0.39 !   Penicillium chrysogen Class 0 kU/L <0.10   House Dust, Mary Class I kU/L 0.41 !   (L): Data is abnormally low  !: Data is abnormal         Assessment and Plan     Diagnoses and all orders for this visit:    1. Annual physical exam (Primary)  -     HPV Vaccine (HPV9)    2. Encounter for well adult exam without abnormal findings    3. Anxiety and depression  -     busPIRone (BUSPAR) 10 MG tablet; Take 1 tablet by mouth 2 (Two) Times a Day for 30 days.  Dispense: 60 tablet; Refill: 0    4. Trouble in sleeping    5. Other acne    6. Deviated septum    7. Other irritable bowel syndrome    8. Bloating    9. Visit for birth control pills maintenance  -     norgestimate-ethinyl estradiol (Tri-Sprintec) 0.18/0.215/0.25 MG-35 MCG per tablet; Take 1 tablet by mouth Daily.  Dispense: 84 tablet; Refill: 0    10. Family history of diabetes mellitus    11. Family history of memory loss    12. Non-smoker    13. Class 2 obesity due to excess calories without serious comorbidity with body mass index (BMI) of 35.0 to 35.9 in adult    14. Encounter for dietary counseling and  surveillance    Plan  Very pleasant visit with patient today for her annual physical exam    Refill of birth control provided for patient up until her next appointment with women's health    Regarding her anxiety and depression, we will start with increasing her BuSpar to 10 mg twice daily.  Patient will try this over the next 2 weeks.  She was also provided a sample of Viibryd.  In 2 weeks she will consider continuing with the BuSpar or adding on the Viibryd.  We will plan to follow-up in 4 weeks for further discussion    We did review previous lab work.  Patient denied any further questions    Please keep upcoming appointment with dermatology    Keep upcoming appointment with allergist    Keep upcoming appointment with ENT    Keep upcoming appointment with women's health    Continue with non-smoking status    Consider healthy lifestyle    Go to ER if any condition worsens or severe    Follow-up in 4 weeks for telehealth for medication review.    Follow-up 1 year for annual visit    Follow Up  Return for 4 wk telelheath med review and 1 year for annual visit.    NICHOLAS Gaona    Part of this note may be an electronic transcription/translation of spoken language to printed text using the Dragon Dictation System.

## 2023-05-10 ENCOUNTER — LAB (OUTPATIENT)
Dept: LAB | Facility: HOSPITAL | Age: 28
End: 2023-05-10
Payer: MEDICAID

## 2023-05-10 ENCOUNTER — TRANSCRIBE ORDERS (OUTPATIENT)
Dept: LAB | Facility: HOSPITAL | Age: 28
End: 2023-05-10
Payer: MEDICAID

## 2023-05-10 DIAGNOSIS — R19.7 DIARRHEA OF PRESUMED INFECTIOUS ORIGIN: ICD-10-CM

## 2023-05-10 DIAGNOSIS — R19.7 DIARRHEA OF PRESUMED INFECTIOUS ORIGIN: Primary | ICD-10-CM

## 2023-05-10 LAB — IGA1 MFR SER: 160 MG/DL (ref 70–400)

## 2023-05-10 PROCEDURE — 86258 DGP ANTIBODY EACH IG CLASS: CPT

## 2023-05-10 PROCEDURE — 86364 TISS TRNSGLTMNASE EA IG CLAS: CPT

## 2023-05-10 PROCEDURE — 82784 ASSAY IGA/IGD/IGG/IGM EACH: CPT

## 2023-05-10 PROCEDURE — 36415 COLL VENOUS BLD VENIPUNCTURE: CPT

## 2023-05-10 PROCEDURE — 86256 FLUORESCENT ANTIBODY TITER: CPT

## 2023-05-11 LAB
GLIADIN PEPTIDE IGA SER-ACNC: 4 UNITS (ref 0–19)
TTG IGA SER-ACNC: <2 U/ML (ref 0–3)

## 2023-05-15 LAB
IGA ANTI-ENDOMYSIAL AB: <2.5 TITER
IGG ANTI-ENDOMYSIAL AB: <2.5 TITER
Lab: NORMAL
PATHOLOGIST NAME: NORMAL
RESULT: NORMAL
SUBSTRATE: NORMAL

## 2023-05-31 ENCOUNTER — TELEMEDICINE (OUTPATIENT)
Dept: INTERNAL MEDICINE | Facility: CLINIC | Age: 28
End: 2023-05-31

## 2023-05-31 DIAGNOSIS — Z09 FOLLOW-UP EXAM: Primary | ICD-10-CM

## 2023-05-31 DIAGNOSIS — F41.9 ANXIETY AND DEPRESSION: ICD-10-CM

## 2023-05-31 DIAGNOSIS — F32.A ANXIETY AND DEPRESSION: ICD-10-CM

## 2023-05-31 RX ORDER — BUSPIRONE HYDROCHLORIDE 10 MG/1
10 TABLET ORAL 2 TIMES DAILY
Qty: 60 TABLET | Refills: 0 | Status: SHIPPED | OUTPATIENT
Start: 2023-05-31 | End: 2023-06-30

## 2023-05-31 RX ORDER — ESCITALOPRAM OXALATE 10 MG/1
10 TABLET ORAL DAILY
Qty: 30 TABLET | Refills: 0 | Status: SHIPPED | OUTPATIENT
Start: 2023-05-31 | End: 2023-06-30

## 2023-05-31 NOTE — PROGRESS NOTES
Telehealth Visit     Date: 2023   Patient Name: Shaniqua Oliveira  : 1995   MRN: [id number]     Chief Complaint:    Chief Complaint   Patient presents with   • Anxiety   • Depression       This provider is located at the [facility] in [place], [place]. The patient is being seen remotely via telehealth at their home address in [place], and stated they are in a secure environment for this session. The patient's condition being diagnosed/treated is appropriate for telemedicine. The provider identified herself as well as her credentials. The patient, and/or patients guardian, consent to be seen remotely, and when consent is given they understand that the consent allows for patient identifiable information to be sent to a third party as needed. They may refuse to be seen remotely at any time. The electronic data is encrypted and password protected, and the patient and/or guardian has been advised of the potential risks to privacy not withstanding such measures.    You have chosen to receive care through a telehealth visit. Do you consent to use a video/audio connection for your medical care today? Yes    History of Present Illness: Shaniqua Oliveira is a 27 y.o. female who is here today to follow up with anxiety and depression medication.    At previous visit we discussed her anxiety and depression.  The hydroxyzine has been very helpful related to sleep.  She was recently started on BuSpar.  We did increase the dose at last visit to 10 mg twice daily.  She is currently seeing a therapist who also recommended she consider starting on an SSRI.  She states she has both a diagnosis of major depression and generalized anxiety disorder.  She feels like she is a mix between both her anxiety and depression symptoms.  Patient was provided a sample of rye bread at previous visit.  She is not able to tolerate this medication as it has also caused her significant reactions of nausea vomiting and her skin  feeling like pins-and-needles.  She is interested in adding an additional medication on to assist with her symptoms.    She has tried Zoloft in the past with intolerable side effects  She has tried Trintellix but plateaued on that medication        Subjective      Review of Systems:   Review of Systems   Constitutional: Negative for chills, fatigue and fever.        Follow up   HENT: Negative for sore throat.    Eyes: Negative for visual disturbance.   Respiratory: Negative for cough and shortness of breath.    Cardiovascular: Negative for chest pain.   Gastrointestinal: Negative for abdominal pain.   Skin: Negative for color change.   Allergic/Immunologic: Negative for immunocompromised state.   Neurological: Negative for headaches.   Psychiatric/Behavioral: Negative for behavioral problems.       I have reviewed and the following portions of the patient's history were updated as appropriate: past family history, past medical history, past social history, past surgical history and problem list.    Medications:     Current Outpatient Medications:   •  busPIRone (BUSPAR) 10 MG tablet, Take 1 tablet by mouth 2 (Two) Times a Day for 30 days. Call before refill to let me know how you are doing, Disp: 60 tablet, Rfl: 0  •  norgestimate-ethinyl estradiol (Tri-Sprintec) 0.18/0.215/0.25 MG-35 MCG per tablet, Take 1 tablet by mouth Daily., Disp: 84 tablet, Rfl: 0  •  escitalopram (Lexapro) 10 MG tablet, Take 1 tablet by mouth Daily for 30 days., Disp: 30 tablet, Rfl: 0    Allergies:   No Known Allergies    Objective     Physical Exam:  Vital Signs: There were no vitals filed for this visit.  There is no height or weight on file to calculate BMI.           Physical Exam  Constitutional:       Appearance: Normal appearance.   HENT:      Head: Normocephalic and atraumatic.   Eyes:      Extraocular Movements: Extraocular movements intact.   Pulmonary:      Effort: No respiratory distress.   Skin:     General: Skin is dry.    Neurological:      Mental Status: She is alert.   Psychiatric:         Mood and Affect: Mood normal.         Behavior: Behavior normal.         Assessment / Plan      Assessment/Plan:   Diagnoses and all orders for this visit:    1. Follow-up exam (Primary)    2. Anxiety and depression  -     busPIRone (BUSPAR) 10 MG tablet; Take 1 tablet by mouth 2 (Two) Times a Day for 30 days. Call before refill to let me know how you are doing  Dispense: 60 tablet; Refill: 0  -     escitalopram (Lexapro) 10 MG tablet; Take 1 tablet by mouth Daily for 30 days.  Dispense: 30 tablet; Refill: 0    Plan  Discussed with patient that we will keep her BuSpar at 10 mg twice daily    Patient has now tried and failed the following:  Zoloft  Trintellix  Viibryd    We will add on Lexapro 10 mg once daily.  I will send in a 30-day prescription and patient will call the office and notify me how she is doing before the end of her prescription.  We will consider further plan of care at that time    Go to ER if any condition worsens or severe    We will plan to follow-up in 6 months for chronic conditions but patient will keep me updated before that time    Follow Up:   Return for 6 month follow up for chronic conditions.    Any medications prescribed have been sent electronically to   [facility] [address] [place] [address] [phone]  - [phone] [place] [address]  Phone: [phone] Fax: [phone]      15 minutes were spent reviewing the patient's questionnaire, formulating a treatment plan, and relaying information to the patient via Prompt.ly.    NICHOLAS Gaona    Part of this note may be an electronic transcription/translation of spoken language to printed text using the Dragon Dictation System.

## 2023-07-25 ENCOUNTER — TELEPHONE (OUTPATIENT)
Dept: INTERNAL MEDICINE | Facility: CLINIC | Age: 28
End: 2023-07-25

## 2023-07-25 NOTE — TELEPHONE ENCOUNTER
Caller: Shaniqua Oliveira    Relationship: Self    Best call back number:[phone]        What specialty or service is being requested: GASTROENTEROLOGIST       Any additional details: THE PATIENT WOULD LIKE TO GET AN UPDATE ON THE REFERRAL SHE WOULD LIKE TO KNOW IF THAT WAS COMPLEATED

## 2023-08-01 ENCOUNTER — OFFICE VISIT (OUTPATIENT)
Dept: OBSTETRICS AND GYNECOLOGY | Facility: CLINIC | Age: 28
End: 2023-08-01
Payer: MEDICAID

## 2023-08-01 ENCOUNTER — PATIENT ROUNDING (BHMG ONLY) (OUTPATIENT)
Dept: OBSTETRICS AND GYNECOLOGY | Facility: CLINIC | Age: 28
End: 2023-08-01
Payer: MEDICAID

## 2023-08-01 VITALS
DIASTOLIC BLOOD PRESSURE: 82 MMHG | BODY MASS INDEX: 33.2 KG/M2 | HEIGHT: 62 IN | SYSTOLIC BLOOD PRESSURE: 108 MMHG | WEIGHT: 180.4 LBS

## 2023-08-01 DIAGNOSIS — Z30.41 VISIT FOR BIRTH CONTROL PILLS MAINTENANCE: ICD-10-CM

## 2023-08-01 DIAGNOSIS — Z01.419 WOMEN'S ANNUAL ROUTINE GYNECOLOGICAL EXAMINATION: Primary | ICD-10-CM

## 2023-08-01 RX ORDER — PHENTERMINE HYDROCHLORIDE 37.5 MG/1
TABLET ORAL
COMMUNITY
Start: 2023-06-28

## 2023-08-01 RX ORDER — NORGESTIMATE AND ETHINYL ESTRADIOL 7DAYSX3 28
1 KIT ORAL DAILY
Qty: 84 TABLET | Refills: 3 | Status: SHIPPED | OUTPATIENT
Start: 2023-08-01

## 2023-08-03 LAB — REF LAB TEST METHOD: NORMAL

## 2023-08-25 ENCOUNTER — TELEPHONE (OUTPATIENT)
Dept: INTERNAL MEDICINE | Facility: CLINIC | Age: 28
End: 2023-08-25
Payer: MEDICAID

## 2023-08-25 NOTE — TELEPHONE ENCOUNTER
Caller: Shaniqua Oliveira    Relationship: Self    Best call back number: [phone]       What was the call regarding: PATIENT WOULD LIKE ALL VACCINATIONS WE HAVE ON FILE FOR HER MADE AVAILABLE TO HER [facility] [employer] SO SHE CAN DOWNLOAD THEM.     Is it okay if the provider responds through [employer]: YES          
HUB TO READ:  ALEAH that vaccine record should be available on [email or web address].  
no

## 2023-10-11 ENCOUNTER — OFFICE VISIT (OUTPATIENT)
Dept: GASTROENTEROLOGY | Facility: CLINIC | Age: 28
End: 2023-10-11
Payer: MEDICAID

## 2023-10-11 ENCOUNTER — LAB (OUTPATIENT)
Dept: LAB | Facility: HOSPITAL | Age: 28
End: 2023-10-11
Payer: MEDICAID

## 2023-10-11 VITALS
HEART RATE: 106 BPM | BODY MASS INDEX: 31.76 KG/M2 | RESPIRATION RATE: 18 BRPM | TEMPERATURE: 98.4 F | WEIGHT: 172.6 LBS | HEIGHT: 62 IN | SYSTOLIC BLOOD PRESSURE: 122 MMHG | DIASTOLIC BLOOD PRESSURE: 72 MMHG | OXYGEN SATURATION: 97 %

## 2023-10-11 DIAGNOSIS — R11.2 NAUSEA AND VOMITING, UNSPECIFIED VOMITING TYPE: ICD-10-CM

## 2023-10-11 DIAGNOSIS — R10.10 UPPER ABDOMINAL PAIN: ICD-10-CM

## 2023-10-11 DIAGNOSIS — Z79.1 NSAID LONG-TERM USE: ICD-10-CM

## 2023-10-11 DIAGNOSIS — K21.9 GASTROESOPHAGEAL REFLUX DISEASE, UNSPECIFIED WHETHER ESOPHAGITIS PRESENT: Primary | ICD-10-CM

## 2023-10-11 DIAGNOSIS — K21.9 GASTROESOPHAGEAL REFLUX DISEASE, UNSPECIFIED WHETHER ESOPHAGITIS PRESENT: ICD-10-CM

## 2023-10-11 LAB
ALBUMIN SERPL-MCNC: 4.2 G/DL (ref 3.5–5.2)
ALBUMIN/GLOB SERPL: 1.4 G/DL
ALP SERPL-CCNC: 60 U/L (ref 39–117)
ALT SERPL W P-5'-P-CCNC: 13 U/L (ref 1–33)
AMYLASE SERPL-CCNC: 55 U/L (ref 28–100)
ANION GAP SERPL CALCULATED.3IONS-SCNC: 10.8 MMOL/L (ref 5–15)
AST SERPL-CCNC: 14 U/L (ref 1–32)
BASOPHILS # BLD AUTO: 0.02 10*3/MM3 (ref 0–0.2)
BASOPHILS NFR BLD AUTO: 0.3 % (ref 0–1.5)
BILIRUB SERPL-MCNC: 0.4 MG/DL (ref 0–1.2)
BUN SERPL-MCNC: 11 MG/DL (ref 6–20)
BUN/CREAT SERPL: 16.9 (ref 7–25)
CALCIUM SPEC-SCNC: 9.3 MG/DL (ref 8.6–10.5)
CHLORIDE SERPL-SCNC: 106 MMOL/L (ref 98–107)
CO2 SERPL-SCNC: 24.2 MMOL/L (ref 22–29)
CREAT SERPL-MCNC: 0.65 MG/DL (ref 0.57–1)
CRP SERPL-MCNC: 1.14 MG/DL (ref 0–0.5)
DEPRECATED RDW RBC AUTO: 39.5 FL (ref 37–54)
EGFRCR SERPLBLD CKD-EPI 2021: 124.7 ML/MIN/1.73
EOSINOPHIL # BLD AUTO: 0.12 10*3/MM3 (ref 0–0.4)
EOSINOPHIL NFR BLD AUTO: 1.6 % (ref 0.3–6.2)
ERYTHROCYTE [DISTWIDTH] IN BLOOD BY AUTOMATED COUNT: 12.3 % (ref 12.3–15.4)
GLOBULIN UR ELPH-MCNC: 3.1 GM/DL
GLUCOSE SERPL-MCNC: 70 MG/DL (ref 65–99)
HCT VFR BLD AUTO: 38 % (ref 34–46.6)
HGB BLD-MCNC: 13 G/DL (ref 12–15.9)
IMM GRANULOCYTES # BLD AUTO: 0.02 10*3/MM3 (ref 0–0.05)
IMM GRANULOCYTES NFR BLD AUTO: 0.3 % (ref 0–0.5)
LIPASE SERPL-CCNC: 27 U/L (ref 13–60)
LYMPHOCYTES # BLD AUTO: 2.93 10*3/MM3 (ref 0.7–3.1)
LYMPHOCYTES NFR BLD AUTO: 39.4 % (ref 19.6–45.3)
MCH RBC QN AUTO: 30 PG (ref 26.6–33)
MCHC RBC AUTO-ENTMCNC: 34.2 G/DL (ref 31.5–35.7)
MCV RBC AUTO: 87.6 FL (ref 79–97)
MONOCYTES # BLD AUTO: 0.41 10*3/MM3 (ref 0.1–0.9)
MONOCYTES NFR BLD AUTO: 5.5 % (ref 5–12)
NEUTROPHILS NFR BLD AUTO: 3.94 10*3/MM3 (ref 1.7–7)
NEUTROPHILS NFR BLD AUTO: 52.9 % (ref 42.7–76)
NRBC BLD AUTO-RTO: 0 /100 WBC (ref 0–0.2)
PLATELET # BLD AUTO: 380 10*3/MM3 (ref 140–450)
PMV BLD AUTO: 10.2 FL (ref 6–12)
POTASSIUM SERPL-SCNC: 3.9 MMOL/L (ref 3.5–5.2)
PROT SERPL-MCNC: 7.3 G/DL (ref 6–8.5)
RBC # BLD AUTO: 4.34 10*6/MM3 (ref 3.77–5.28)
SODIUM SERPL-SCNC: 141 MMOL/L (ref 136–145)
WBC NRBC COR # BLD: 7.44 10*3/MM3 (ref 3.4–10.8)

## 2023-10-11 PROCEDURE — 83013 H PYLORI (C-13) BREATH: CPT

## 2023-10-11 PROCEDURE — 86140 C-REACTIVE PROTEIN: CPT

## 2023-10-11 PROCEDURE — 80053 COMPREHEN METABOLIC PANEL: CPT | Performed by: PHYSICIAN ASSISTANT

## 2023-10-11 PROCEDURE — 85025 COMPLETE CBC W/AUTO DIFF WBC: CPT

## 2023-10-11 PROCEDURE — 36415 COLL VENOUS BLD VENIPUNCTURE: CPT | Performed by: PHYSICIAN ASSISTANT

## 2023-10-11 PROCEDURE — 83690 ASSAY OF LIPASE: CPT | Performed by: PHYSICIAN ASSISTANT

## 2023-10-11 PROCEDURE — 82150 ASSAY OF AMYLASE: CPT | Performed by: PHYSICIAN ASSISTANT

## 2023-10-11 RX ORDER — HYDROXYZINE HYDROCHLORIDE 10 MG/1
10 TABLET, FILM COATED ORAL EVERY 6 HOURS PRN
COMMUNITY

## 2023-10-11 RX ORDER — PANTOPRAZOLE SODIUM 40 MG/1
40 TABLET, DELAYED RELEASE ORAL DAILY
Qty: 90 TABLET | Refills: 3 | Status: SHIPPED | OUTPATIENT
Start: 2023-10-11

## 2023-10-11 NOTE — PROGRESS NOTES
New Patient Consultation     Patient Name: Shaniqua Oliveira  : 1995   MRN: [id number]     Chief Complaint:  No chief complaint on file.      History of Present Illness: Shaniqua Oliveira is a 28 y.o. female, PMH includes anxiety and depression, who is here today for a Gastroenterology Consultation for IBS, GERD.     Pt has been followed by allergist, and extensive food allergy testing, celiac makers have been unrevealing. Pt has previously had issues with constipation and bloating, which have been improved since she started phentermine 2023. She generally has a complete, formed BM daily.    She notes about 6mo h/o worsened esophageal reflux, belching, bilious emesis, particularly at night. She denies specific aggravating foods or factors otherwise. She has been taking OTC pepcid with minimal relief. She notes daily nausea, worsened postprandially.     Patient denies associated fever, chills, diarrhea, constipation, hematemesis, dysphagia, hematochezia, melena, dysuria, jaundice or bruising.    Patient denies personal or FHx of PUD, H Pylori, gastritis, pancreatitis, colitis, Celiac disease, UC, Crohn's disease, colon or gastric cancers. Pt denies tobacco, illicit substance use. No previous abdominal surgeries. NSAID use few times per week. Occasional EtOH use. 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     EGD / CSY in remote past, no acute findings.     Subjective      Review of Systems:   Review of Systems   Constitutional:  Negative for appetite change, chills, diaphoresis, fatigue, fever, unexpected weight gain and unexpected weight loss.   HENT:  Negative for drooling, facial swelling, mouth sores, rhinorrhea, sore throat, tinnitus, trouble swallowing and voice change.    Eyes: Negative.    Respiratory:  Negative for cough, chest tightness and shortness of breath.    Cardiovascular:  Negative for chest pain.   Gastrointestinal:  Positive for abdominal pain (upper), nausea, vomiting, GERD  and indigestion. Negative for blood in stool, constipation and diarrhea.   Genitourinary:  Negative for dysuria, flank pain, hematuria and pelvic pain.   Musculoskeletal:  Negative for arthralgias and myalgias.   Skin:  Negative for color change, pallor and rash.   Neurological:  Negative for dizziness, tremors, syncope, weakness and numbness.   Psychiatric/Behavioral:  Negative for hallucinations and sleep disturbance. The patient is not nervous/anxious.    All other systems reviewed and are negative.      Past Medical History:   Past Medical History:   Diagnosis Date    Anxiety 2019    Depression 2013    Irritable bowel syndrome 2012       Past Surgical History:   Past Surgical History:   Procedure Laterality Date    COLONOSCOPY  2011    WISDOM TOOTH EXTRACTION  2014       Family History:   Family History   Problem Relation Age of Onset    Obesity Mother     Rheum arthritis Mother     Macular degeneration Mother     Glaucoma Mother     Osteoporosis Mother     Diabetes Sister     Obesity Father     Diabetes Father        Social History:   Social History     Socioeconomic History    Marital status: Single   Tobacco Use    Smoking status: Never    Smokeless tobacco: Never   Vaping Use    Vaping Use: Never used   Substance and Sexual Activity    Alcohol use: Not Currently    Drug use: Never    Sexual activity: Not Currently     Partners: Male     Birth control/protection: Condom, Pill     Comment: Oral birth control       Alcohol/Tobacco History:   Social History     Substance and Sexual Activity   Alcohol Use Not Currently     Social History     Tobacco Use   Smoking Status Never   Smokeless Tobacco Never       Medications:     Current Outpatient Medications:     norgestimate-ethinyl estradiol (Tri-Sprintec) 0.18/0.215/0.25 MG-35 MCG per tablet, Take 1 tablet by mouth Daily., Disp: 84 tablet, Rfl: 3    phentermine (ADIPEX-P) 37.5 MG tablet, , Disp: , Rfl:     Allergies:   No Known Allergies    Objective     Physical  Exam:  Vital Signs: There were no vitals filed for this visit.  There is no height or weight on file to calculate BMI.     Physical Exam  Vitals and nursing note reviewed.   Constitutional:       Appearance: Normal appearance. She is normal weight. She is not ill-appearing or diaphoretic.      Comments: BMI 31.57   HENT:      Head: Normocephalic and atraumatic.      Right Ear: External ear normal.      Left Ear: External ear normal.      Nose: Nose normal.      Mouth/Throat:      Mouth: Mucous membranes are moist.      Pharynx: Oropharynx is clear.   Eyes:      Conjunctiva/sclera: Conjunctivae normal.      Pupils: Pupils are equal, round, and reactive to light.   Neck:      Thyroid: No thyromegaly.   Cardiovascular:      Rate and Rhythm: Normal rate and regular rhythm.      Pulses: Normal pulses.      Heart sounds: Normal heart sounds.   Pulmonary:      Effort: Pulmonary effort is normal.      Breath sounds: Normal breath sounds.   Abdominal:      General: Abdomen is flat. Bowel sounds are normal. There is no distension.      Tenderness: There is abdominal tenderness (mild, epigastric and RUQ).   Musculoskeletal:         General: Normal range of motion.      Cervical back: Normal range of motion and neck supple.   Skin:     General: Skin is warm and dry.   Neurological:      General: No focal deficit present.      Mental Status: She is oriented to person, place, and time.   Psychiatric:         Mood and Affect: Mood normal.         Assessment / Plan      Assessment/Plan:   There are no diagnoses linked to this encounter.     IBS  GERD  Upper abdominal pain  Nausea and vomiting   - rx for pantoprazole 40mg daily sent to pharmacy    - pt given GERD diet instructions, advised to avoid GI irritants such as caffeine, carbonation, EtOH, tobacco, chocolate, peppermint, acid-based foods   - obtain CBC, CMP, CRP, lipase, amylase, H Pylori breath test   - obtain gallbladder US   - schedule for EGD if sx persist   - follow up  in clinic in 1mo, or after completion of above studies   - call clinic at any time for questions or new / worsened sx      Follow Up:   Return in about 4 weeks (around 11/8/2023).    Plan of care reviewed with the patient at the conclusion of today's visit.  Education was provided regarding diagnosis, management, and any prescribed or recommended OTC medications.  Patient verbalized understanding of and agreement with management plan.     NOTE TO PATIENT: The 21st Century Cures Act makes medical notes like these available to patients in the interest of transparency. However, be advised this is a medical document. It is intended as peer to peer communication. It is written in medical language and may contain abbreviations or verbiage that are unfamiliar. It may appear blunt or direct. Medical documents are intended to carry relevant information, facts as evident, and the clinical opinion of the practitioner.     Time Statement:   Discussed plan of care in detail with patient today. Patient verbally understands and agrees. I have spent 45 minutes reviewing available diagnostics, obtaining history, examining the patient, developing a treatment plan, and educating the patient on disease process and plan of care.    Marci Juarez PA-C   [facility] [place] [facility]

## 2023-10-12 LAB — UREA BREATH TEST QL: NEGATIVE

## 2023-10-12 NOTE — PROGRESS NOTES
Please let Shaniqua know that CBC, CMP, lipase, amylase are within normal limits. Her CRP is slightly elevated, which can be for a number of reasons. I will call her to discuss next steps when I see the results of her H Pylori breath test.

## 2023-11-01 ENCOUNTER — OFFICE VISIT (OUTPATIENT)
Dept: INTERNAL MEDICINE | Facility: CLINIC | Age: 28
End: 2023-11-01
Payer: MEDICAID

## 2023-11-01 VITALS
HEART RATE: 104 BPM | HEIGHT: 62 IN | BODY MASS INDEX: 30.55 KG/M2 | TEMPERATURE: 97.4 F | WEIGHT: 166 LBS | OXYGEN SATURATION: 98 % | SYSTOLIC BLOOD PRESSURE: 118 MMHG | DIASTOLIC BLOOD PRESSURE: 86 MMHG

## 2023-11-01 DIAGNOSIS — Z09 ENCOUNTER FOR FOLLOW-UP: Primary | ICD-10-CM

## 2023-11-01 DIAGNOSIS — K21.9 GASTROESOPHAGEAL REFLUX DISEASE, UNSPECIFIED WHETHER ESOPHAGITIS PRESENT: ICD-10-CM

## 2023-11-01 DIAGNOSIS — F32.A ANXIETY AND DEPRESSION: ICD-10-CM

## 2023-11-01 DIAGNOSIS — E66.09 CLASS 1 OBESITY DUE TO EXCESS CALORIES WITH SERIOUS COMORBIDITY AND BODY MASS INDEX (BMI) OF 30.0 TO 30.9 IN ADULT: ICD-10-CM

## 2023-11-01 DIAGNOSIS — F41.9 ANXIETY AND DEPRESSION: ICD-10-CM

## 2023-11-01 DIAGNOSIS — K58.8 OTHER IRRITABLE BOWEL SYNDROME: ICD-10-CM

## 2023-11-01 RX ORDER — HYDROXYZINE HYDROCHLORIDE 10 MG/1
10 TABLET, FILM COATED ORAL EVERY 6 HOURS PRN
Qty: 120 TABLET | Refills: 3 | Status: SHIPPED | OUTPATIENT
Start: 2023-11-01

## 2023-11-01 NOTE — PROGRESS NOTES
Office Note     Name: Shaniqua Oliveira    : 1995     MRN: [id number]     Chief Complaint  Follow-up    Subjective     History of Present Illness:  Shaniqua Oliveira is a 28 y.o. female who presents today for follow-up on chronic conditions    Patient was last seen in May for annual physical exam    Anxiety and depression: Patient is currently only using hydroxyzine as needed.  We did adjust her BuSpar at recent visit.  She states she was having illness symptoms and had to end up cutting everything out and slowly adding things back in.  She did not add back in the BuSpar.  Patient will plan to follow-up further in May and discuss this.  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  Patient has been tolerating this medication very well    Patient has seen GI since our last visit.  Patient was having issues with constipation and bloating but those have improved since starting phentermine in 2023.  She usually has a complete formed daily bowel movement.  With GI, she reported a 6-month history of worsening reflux, belching, bilious emesis at night.  Patient was provided a prescription of Protonix 40 mg.  Given instructions about GERD.  Labs and ultrasound were ordered.  Plan follow-up in 1 month.  Patient did let me know that they did adjust the Protonix.  She requested this medication be taken off of her list.  Again she did reiterate that she has an upcoming gallbladder ultrasound.  Negative for H. pylori.        Past Medical History:   Diagnosis Date    Anxiety 2019    Depression 2013    Irritable bowel syndrome 2012       Past Surgical History:   Procedure Laterality Date    COLONOSCOPY      UPPER GASTROINTESTINAL ENDOSCOPY      WISDOM TOOTH EXTRACTION         Social History     Socioeconomic History    Marital status: Single   Tobacco Use    Smoking status: Never    Smokeless tobacco: Never  "  Vaping Use    Vaping Use: Never used   Substance and Sexual Activity    Alcohol use: Yes     Comment: occ    Drug use: Never    Sexual activity: Not Currently     Partners: Male     Birth control/protection: Condom, Pill     Comment: Oral birth control         Current Outpatient Medications:     hydrOXYzine (ATARAX) 10 MG tablet, Take 1 tablet by mouth Every 6 (Six) Hours As Needed for Anxiety., Disp: 120 tablet, Rfl: 3    norgestimate-ethinyl estradiol (Tri-Sprintec) 0.18/0.215/0.25 MG-35 MCG per tablet, Take 1 tablet by mouth Daily., Disp: 84 tablet, Rfl: 3    phentermine (ADIPEX-P) 37.5 MG tablet, , Disp: , Rfl:     Objective     Vital Signs  /86   Pulse 104   Temp 97.4 °F (36.3 °C)   Ht 157.5 cm (62\")   Wt 75.3 kg (166 lb)   SpO2 98%   BMI 30.36 kg/m²   Estimated body mass index is 30.36 kg/m² as calculated from the following:    Height as of this encounter: 157.5 cm (62\").    Weight as of this encounter: 75.3 kg (166 lb).         Physical Exam  Vitals and nursing note reviewed.   Constitutional:       General: She is awake.      Appearance: Normal appearance. She is well-groomed. She is obese.   HENT:      Head: Normocephalic and atraumatic.      Right Ear: Hearing and external ear normal.      Left Ear: Hearing and external ear normal.      Nose: Nose normal.      Mouth/Throat:      Lips: Pink.      Mouth: Mucous membranes are moist.   Eyes:      Extraocular Movements: Extraocular movements intact.      Pupils: Pupils are equal, round, and reactive to light.   Cardiovascular:      Rate and Rhythm: Normal rate and regular rhythm.      Pulses: Normal pulses.      Heart sounds: Normal heart sounds.   Pulmonary:      Effort: Pulmonary effort is normal.      Breath sounds: Normal breath sounds.   Musculoskeletal:         General: Normal range of motion.   Skin:     General: Skin is warm and dry.   Neurological:      Mental Status: She is alert and oriented to person, place, and time.   Psychiatric:    "      Mood and Affect: Mood normal.         Behavior: Behavior normal. Behavior is cooperative.         Thought Content: Thought content does not include homicidal or suicidal ideation.                   Assessment and Plan     Diagnoses and all orders for this visit:    1. Encounter for follow-up (Primary)    2. Anxiety and depression  -     hydrOXYzine (ATARAX) 10 MG tablet; Take 1 tablet by mouth Every 6 (Six) Hours As Needed for Anxiety.  Dispense: 120 tablet; Refill: 3    3. Class 1 obesity due to excess calories with serious comorbidity and body mass index (BMI) of 30.0 to 30.9 in adult    4. Other irritable bowel syndrome    5. Gastroesophageal reflux disease, unspecified whether esophagitis present    Plan  Follow-up visit completed with patient today    Refill of hydroxyzine 120 tablets with 3 refills sent to the pharmacy.  This should ideally be enough medication to get her to her appointment in May    Continue to stay up-to-date with women's health, weight loss clinic, GI    Go to ER if any condition worsens or severe    Patient declined a COVID booster today    Plan to follow-up as scheduled in May for annual.  We will address her anxiety and depression concerns further at that time per her request    Follow Up  Return for Next scheduled follow up.    NICHOLAS Gaona    Part of this note may be an electronic transcription/translation of spoken language to printed text using the Dragon Dictation System.

## 2023-11-15 ENCOUNTER — HOSPITAL ENCOUNTER (OUTPATIENT)
Dept: ULTRASOUND IMAGING | Facility: HOSPITAL | Age: 28
Discharge: HOME | End: 2023-11-15
Admitting: PHYSICIAN ASSISTANT
Payer: MEDICAID

## 2023-11-15 DIAGNOSIS — R11.2 NAUSEA AND VOMITING, UNSPECIFIED VOMITING TYPE: ICD-10-CM

## 2023-11-15 DIAGNOSIS — R10.10 UPPER ABDOMINAL PAIN: ICD-10-CM

## 2023-11-15 PROCEDURE — 76705 ECHO EXAM OF ABDOMEN: CPT

## 2023-11-16 ENCOUNTER — TRANSCRIBE ORDERS (OUTPATIENT)
Dept: NUTRITION | Facility: HOSPITAL | Age: 28
End: 2023-11-16
Payer: MEDICAID

## 2023-11-16 ENCOUNTER — TELEMEDICINE (OUTPATIENT)
Dept: GASTROENTEROLOGY | Facility: CLINIC | Age: 28
End: 2023-11-16
Payer: MEDICAID

## 2023-11-16 DIAGNOSIS — R63.5 ABNORMAL WEIGHT GAIN: Primary | ICD-10-CM

## 2023-11-16 DIAGNOSIS — R10.10 UPPER ABDOMINAL PAIN: ICD-10-CM

## 2023-11-16 DIAGNOSIS — K21.9 GASTROESOPHAGEAL REFLUX DISEASE, UNSPECIFIED WHETHER ESOPHAGITIS PRESENT: Primary | ICD-10-CM

## 2023-11-16 DIAGNOSIS — R11.2 NAUSEA AND VOMITING, UNSPECIFIED VOMITING TYPE: ICD-10-CM

## 2023-11-16 RX ORDER — PANTOPRAZOLE SODIUM 40 MG/1
40 TABLET, DELAYED RELEASE ORAL 2 TIMES DAILY
Qty: 60 TABLET | Refills: 5 | Status: SHIPPED | OUTPATIENT
Start: 2023-11-16

## 2023-11-16 NOTE — PROGRESS NOTES
Follow Up      Patient Name: Shaniqua Oliveira  : 1995   MRN: [id number]     Chief Complaint:  No chief complaint on file.      History of Present Illness: Shaniqua Oliveira is a 28 y.o. female who is here today for follow up on abdominal pain.    Pt has had some improvement of esophageal reflux, epigastric abdominal pain, nausea with addition of pantoprazole 40mg daily. She identifies greasy, spicy food and dairy products to be particularly aggravating. She notes sx are worse in the evening.     Patient denies associated fever, chills, vomiting, diarrhea, constipation, hematemesis, dysphagia, hematochezia, melena, bloating, weight loss or gain, dysuria, jaundice or bruising.    Labs since last visit reveal normal CBC, CMP, lipase, amylase within normal limits. CRP 1.14. H Pylori breath test negative.     Gallbladder US 2023: Normal RUQ US without cholelithiasis, gallbladder wall thickening, sludge.     Subjective      Review of Systems:   Review of Systems   Constitutional:  Negative for appetite change, chills, diaphoresis, fatigue, fever, unexpected weight gain and unexpected weight loss.   HENT:  Negative for drooling, facial swelling, mouth sores, rhinorrhea, sore throat, tinnitus, trouble swallowing and voice change.    Eyes: Negative.    Respiratory:  Negative for cough, chest tightness and shortness of breath.    Cardiovascular:  Negative for chest pain.   Gastrointestinal:  Positive for abdominal pain, nausea and GERD. Negative for blood in stool, constipation, diarrhea, vomiting and indigestion.   Genitourinary:  Negative for dysuria, flank pain, hematuria and pelvic pain.   Musculoskeletal:  Negative for arthralgias and myalgias.   Skin:  Negative for color change, pallor and rash.   Neurological:  Negative for dizziness, tremors, syncope, weakness and numbness.   Psychiatric/Behavioral:  Negative for hallucinations and sleep disturbance. The patient is not nervous/anxious.    All other systems  reviewed and are negative.      Medications:     Current Outpatient Medications:     hydrOXYzine (ATARAX) 10 MG tablet, Take 1 tablet by mouth Every 6 (Six) Hours As Needed for Anxiety., Disp: 120 tablet, Rfl: 3    norgestimate-ethinyl estradiol (Tri-Sprintec) 0.18/0.215/0.25 MG-35 MCG per tablet, Take 1 tablet by mouth Daily., Disp: 84 tablet, Rfl: 3    phentermine (ADIPEX-P) 37.5 MG tablet, , Disp: , Rfl:     Allergies:   No Known Allergies    Social History:   Social History     Socioeconomic History    Marital status: Single   Tobacco Use    Smoking status: Never    Smokeless tobacco: Never   Vaping Use    Vaping Use: Never used   Substance and Sexual Activity    Alcohol use: Yes     Comment: occ    Drug use: Never    Sexual activity: Not Currently     Partners: Male     Birth control/protection: Condom, Pill     Comment: Oral birth control        Surgical History:   Past Surgical History:   Procedure Laterality Date    COLONOSCOPY  2011    UPPER GASTROINTESTINAL ENDOSCOPY  2011    WISDOM TOOTH EXTRACTION  2014        Medical History:   Past Medical History:   Diagnosis Date    Anxiety 2019    Depression 2013    Irritable bowel syndrome 2012        Objective     Physical Exam:  Vital Signs: There were no vitals filed for this visit.  There is no height or weight on file to calculate BMI.     Physical Exam  Constitutional:       Appearance: Normal appearance. She is normal weight. She is not ill-appearing or diaphoretic.   HENT:      Head: Normocephalic and atraumatic.      Right Ear: External ear normal.      Left Ear: External ear normal.      Nose: Nose normal. No rhinorrhea.      Mouth/Throat:      Mouth: Mucous membranes are moist.   Eyes:      General:         Right eye: No discharge.         Left eye: No discharge.      Conjunctiva/sclera: Conjunctivae normal.      Pupils: Pupils are equal, round, and reactive to light.   Neck:      Thyroid: No thyromegaly.   Pulmonary:      Effort: Pulmonary effort is  normal. No respiratory distress.   Abdominal:      General: Abdomen is flat. There is no distension.   Musculoskeletal:         General: Normal range of motion.      Cervical back: Normal range of motion.   Skin:     Coloration: Skin is not jaundiced or pale.      Findings: No bruising.   Neurological:      Mental Status: She is oriented to person, place, and time.      Cranial Nerves: No cranial nerve deficit.   Psychiatric:         Mood and Affect: Mood normal.         Thought Content: Thought content normal.         Judgment: Judgment normal.         Assessment / Plan      Assessment/Plan:   There are no diagnoses linked to this encounter.     You have chosen to receive care through a telephone visit. Do you consent to use a telephone visit for your medical care today? Yes    IBS  GERD  Upper abdominal pain  Nausea and vomiting   - continue pantoprazole 40mg, refills sent to pharmacy for increased dose to BID   - pt given GERD diet instructions, advised to avoid GI irritants such as caffeine, carbonation, EtOH, tobacco, chocolate, peppermint, acid-based foods   - previous labs and imaging reviewed with patient   - obtain HIDA scan   - schedule for EGD   - follow up in clinic after completion of above studies   - call clinic at any time for questions or new / worsened sx    Follow Up:   Return in about 6 weeks (around 12/28/2023).    Plan of care reviewed with the patient at the conclusion of today's visit.  Education was provided regarding diagnosis, management, and any prescribed or recommended OTC medications.  Patient verbalized understanding of and agreement with management plan.     NOTE TO PATIENT: The 21st Century Cures Act makes medical notes like these available to patients in the interest of transparency. However, be advised this is a medical document. It is intended as peer to peer communication. It is written in medical language and may contain abbreviations or verbiage that are unfamiliar. It may  appear blunt or direct. Medical documents are intended to carry relevant information, facts as evident, and the clinical opinion of the practitioner.     Time Statement:   Discussed plan of care in detail with patient today. Patient verbally understands and agrees. I have spent 30 minutes reviewing available diagnostics, obtaining history, examining the patient, developing a treatment plan, and educating the patient on disease process and plan of care.     Marci Juarez PA-C   [facility]

## 2023-12-04 ENCOUNTER — HOSPITAL ENCOUNTER (OUTPATIENT)
Dept: NUTRITION | Facility: HOSPITAL | Age: 28
Setting detail: RECURRING SERIES
Discharge: HOME | End: 2023-12-04

## 2023-12-04 PROCEDURE — 97802 MEDICAL NUTRITION INDIV IN: CPT

## 2023-12-04 NOTE — CONSULTS
[facility] Nutrition [facility]          Initial 60 Minute Nutrition Visit    Date: 2023   Patient Name: Shaniqua Oliveira  : 1995   MRN: [id number]   Referring Provider: Joy Dumas A*    Reason for Visit: Weight loss  Visit Format: In person    Nutrition Assessment       Social History:   Social History     Socioeconomic History    Marital status: Single   Tobacco Use    Smoking status: Never    Smokeless tobacco: Never   Vaping Use    Vaping Use: Never used   Substance and Sexual Activity    Alcohol use: Yes     Comment: occ    Drug use: Never    Sexual activity: Not Currently     Partners: Male     Birth control/protection: Condom, Pill     Comment: Oral birth control     Active Problem List:   Patient Active Problem List    Diagnosis     Other acne [L70.8]     Bloating [R14.0]     Deviated septum [J34.2]     Trouble in sleeping [G47.9]     Family history of rheumatoid arthritis [Z82.61]     Family history of diabetes mellitus [Z83.3]     Anxiety and depression [F41.9, F32.A]       Current Medications:   Current Outpatient Medications:     hydrOXYzine (ATARAX) 10 MG tablet, Take 1 tablet by mouth Every 6 (Six) Hours As Needed for Anxiety., Disp: 120 tablet, Rfl: 3    norgestimate-ethinyl estradiol (Tri-Sprintec) 0.18/0.215/0.25 MG-35 MCG per tablet, Take 1 tablet by mouth Daily., Disp: 84 tablet, Rfl: 3    pantoprazole (PROTONIX) 40 MG EC tablet, Take 1 tablet by mouth 2 (Two) Times a Day., Disp: 60 tablet, Rfl: 5    phentermine (ADIPEX-P) 37.5 MG tablet, , Disp: , Rfl:     Labs: NA    Hunger Vital Sign Food Insecurity Assessment:  Within the past 12 months I/we worried whether our food would run out before I/we got money to buy more: No   Within the past 12 months the food I/we bought just didn't last and I/we didn't have money to get more: No   Use of food assistance programs (WIC, food stamps, food byrne) No       Food & Nutrition Related History       Food Allergies:  "NKFA  Food Intolerances: Dairy  Food Behavior: None indicated  Nutrition Impact Symptoms:  None indicated  Gastrointestinal conditions that impact intake or food choices: None indicated  Details at home: In grad school at [employer]  Who prepares most meals: Patient    Who does grocery shopping: Patient   How many meals are purchased from fast food/sit down restaurants per week:  Did not discuss  Difficulty chewin - Normal  Difficulty swallowin - Normal  Diet requirement related to personal preference or cultural belief:  None indicated  History of eating disorder/disordered eating habits: None  Language/communication details: English  Barriers to learning: No barriers identified at this time    24 Hour Recall:   Time Food/beverages consumed   B Protein shake   L Mini corndogs   D Pulled pork sandwich   S popcorn                     Additional comments: Patient typically has 2 meals and a small snack for lunch. Snacks occasionally. Drinks mainly water, about 20-25 oz/d    Anthropometrics      Height:   Ht Readings from Last 1 Encounters:   23 157.5 cm (62\")     Weight:   Wt Readings from Last 3 Encounters:   23 75.3 kg (166 lb)   10/11/23 78.3 kg (172 lb 9.6 oz)   23 81.8 kg (180 lb 6.4 oz)     BMI: There is no height or weight on file to calculate BMI.   Weight Change: 35# weight loss since , on phentermine     Physical Activity     Barriers to physical activity: None indicated     Physical activity comments: Walks dog daily     Estimated Needs     Estimated Energy Needs: Did not discuss    Estimated Protein Needs: Did not discuss     Estimated Fluid Needs: At least 64 oz/d     Discussion / Education      Patient seen in person for weight loss consult. Patient currently taking phentermine. Patient interested in learning about ways to eat healthier on a budget and with time constraints. Patient reports she wants to stop taking weight loss medications, is concerned about weight gain when she " stops.    The 3 macronutrient's and their fxn were reviewed. Emphasized whole grains. Discussed choosing lean proteins. Reviewed ways to decrease fat intake including choosing lean meats and non fried foods. Utilized the plate method as a means of portion control. Reviewed sample meal options. Discussed increasing fiber to 25 grams per day and water to 64 oz per day.    Patient asked for information of cortisol levels. Discussed the correlation of the GI systems and hormones. Reviewed increasing fiber and adding in a probiotic. Patient asked about vitamin supplementation, suggested a prenatal vitamin.     Patient has a very busy schedule. Discussed easy options such as pre-grilled frozen chicken, microwave rice, microwave veggies, smoothies, yogurt, turkey jerky, popcorn, and fruits such as apples, bananas, and oranges. Patient accepting of information. RD office line and email provided.     Assessment of patient engagement: Engaged    Measurement of understanding: Patient verbalized understanding    Resources Provided:    The 3 macronutrient's   Macronutrient's foundation  Plate method  Eating to feel your best  High fiber snack list     Goal (s)      Goal 1: Make smoothies      Goal 2: Increase intake of vegetables     Goal 3: Increase intake of lean proteins      Plan of Care     PES Statement:   Food and nutrition related to knowledge deficit related to no prior nutrition education as evidenced by need for nutrition education on weight loss.     Follow Up Visit      Follow Up:   Patient to schedule    Total of 60 minutes spent with patient on nutrition counseling. Education based on Academy of Nutrition and Dietetics guidelines. Patient was provided with RD's contact information. Thank you for this referral.    I have reviewed the notes, assessments, and documentation performed by Yusef Gibbs, I concur with her documentation of Shaniqua Oliveira.

## 2024-01-12 ENCOUNTER — OUTSIDE FACILITY SERVICE (OUTPATIENT)
Dept: GASTROENTEROLOGY | Facility: CLINIC | Age: 29
End: 2024-01-12
Payer: MEDICAID

## 2024-01-12 PROCEDURE — 43239 EGD BIOPSY SINGLE/MULTIPLE: CPT | Performed by: INTERNAL MEDICINE

## 2024-02-06 ENCOUNTER — HOSPITAL ENCOUNTER (OUTPATIENT)
Dept: NUCLEAR MEDICINE | Facility: HOSPITAL | Age: 29
Discharge: HOME | End: 2024-02-06
Payer: MEDICAID

## 2024-02-06 VITALS — BODY MASS INDEX: 29.26 KG/M2 | WEIGHT: 160 LBS

## 2024-02-06 DIAGNOSIS — R10.10 UPPER ABDOMINAL PAIN: ICD-10-CM

## 2024-02-06 DIAGNOSIS — K21.9 GASTROESOPHAGEAL REFLUX DISEASE, UNSPECIFIED WHETHER ESOPHAGITIS PRESENT: ICD-10-CM

## 2024-02-06 DIAGNOSIS — R11.2 NAUSEA AND VOMITING, UNSPECIFIED VOMITING TYPE: ICD-10-CM

## 2024-02-06 PROCEDURE — 0 TECHNETIUM TC 99M MEBROFENIN KIT: Performed by: PHYSICIAN ASSISTANT

## 2024-02-06 PROCEDURE — 78227 HEPATOBIL SYST IMAGE W/DRUG: CPT

## 2024-02-06 PROCEDURE — A9537 TC99M MEBROFENIN: HCPCS | Performed by: PHYSICIAN ASSISTANT

## 2024-02-06 PROCEDURE — 25010000002 SINCALIDE PER 5 MCG: Performed by: PHYSICIAN ASSISTANT

## 2024-02-06 RX ORDER — SINCALIDE 5 UG/5ML
0.02 INJECTION, POWDER, LYOPHILIZED, FOR SOLUTION INTRAVENOUS ONCE
Qty: 5 ML | Refills: 0 | Status: COMPLETED | OUTPATIENT
Start: 2024-02-06 | End: 2024-02-06

## 2024-02-06 RX ORDER — KIT FOR THE PREPARATION OF TECHNETIUM TC 99M MEBROFENIN 45 MG/10ML
1 INJECTION, POWDER, LYOPHILIZED, FOR SOLUTION INTRAVENOUS
Status: COMPLETED | OUTPATIENT
Start: 2024-02-06 | End: 2024-02-06

## 2024-02-06 RX ADMIN — SINCALIDE 1.5 MCG: 5 INJECTION, POWDER, LYOPHILIZED, FOR SOLUTION INTRAVENOUS at 10:15

## 2024-02-06 RX ADMIN — MEBROFENIN 1 DOSE: 45 INJECTION, POWDER, LYOPHILIZED, FOR SOLUTION INTRAVENOUS at 09:00

## 2024-02-15 ENCOUNTER — TELEMEDICINE (OUTPATIENT)
Dept: GASTROENTEROLOGY | Facility: CLINIC | Age: 29
End: 2024-02-15
Payer: MEDICAID

## 2024-02-15 ENCOUNTER — OFFICE VISIT (OUTPATIENT)
Dept: INTERNAL MEDICINE | Facility: CLINIC | Age: 29
End: 2024-02-15
Payer: MEDICAID

## 2024-02-15 VITALS
BODY MASS INDEX: 28.71 KG/M2 | DIASTOLIC BLOOD PRESSURE: 72 MMHG | HEART RATE: 105 BPM | WEIGHT: 156 LBS | HEIGHT: 62 IN | OXYGEN SATURATION: 96 % | TEMPERATURE: 97.3 F | SYSTOLIC BLOOD PRESSURE: 120 MMHG

## 2024-02-15 DIAGNOSIS — K29.70 GASTRITIS WITHOUT BLEEDING, UNSPECIFIED CHRONICITY, UNSPECIFIED GASTRITIS TYPE: ICD-10-CM

## 2024-02-15 DIAGNOSIS — B35.1 ONYCHOMYCOSIS: Primary | ICD-10-CM

## 2024-02-15 DIAGNOSIS — R10.10 UPPER ABDOMINAL PAIN: ICD-10-CM

## 2024-02-15 DIAGNOSIS — K21.9 GASTROESOPHAGEAL REFLUX DISEASE, UNSPECIFIED WHETHER ESOPHAGITIS PRESENT: Primary | ICD-10-CM

## 2024-02-15 PROCEDURE — 1159F MED LIST DOCD IN RCRD: CPT | Performed by: NURSE PRACTITIONER

## 2024-02-15 PROCEDURE — 1160F RVW MEDS BY RX/DR IN RCRD: CPT | Performed by: NURSE PRACTITIONER

## 2024-02-15 PROCEDURE — 99213 OFFICE O/P EST LOW 20 MIN: CPT | Performed by: NURSE PRACTITIONER

## 2024-02-15 RX ORDER — ALBUTEROL SULFATE 90 UG/1
AEROSOL, METERED RESPIRATORY (INHALATION)
COMMUNITY
Start: 2023-12-10 | End: 2024-02-15

## 2024-02-15 RX ORDER — SUCRALFATE 1 G/1
1 TABLET ORAL 4 TIMES DAILY
Qty: 56 TABLET | Refills: 0 | Status: SHIPPED | OUTPATIENT
Start: 2024-02-15 | End: 2024-02-29

## 2024-04-23 DIAGNOSIS — B35.1 ONYCHOMYCOSIS: ICD-10-CM

## 2024-04-24 RX ORDER — EFINACONAZOLE 100 MG/ML
SOLUTION TOPICAL
Qty: 8 ML | Refills: 1 | Status: SHIPPED | OUTPATIENT
Start: 2024-04-24

## 2024-04-24 NOTE — TELEPHONE ENCOUNTER
Called and left VM for pt to return call to office.   >>>>>>>>OK FOR HUB TO RELAY MESSAGE FROM PROVIDER>>>>>

## 2024-05-29 ENCOUNTER — OFFICE VISIT (OUTPATIENT)
Dept: INTERNAL MEDICINE | Facility: CLINIC | Age: 29
End: 2024-05-29
Payer: MEDICAID

## 2024-05-29 VITALS
DIASTOLIC BLOOD PRESSURE: 72 MMHG | WEIGHT: 161.6 LBS | HEART RATE: 61 BPM | SYSTOLIC BLOOD PRESSURE: 112 MMHG | HEIGHT: 62 IN | OXYGEN SATURATION: 98 % | BODY MASS INDEX: 29.74 KG/M2

## 2024-05-29 DIAGNOSIS — F32.A ANXIETY AND DEPRESSION: ICD-10-CM

## 2024-05-29 DIAGNOSIS — Z00.00 ANNUAL PHYSICAL EXAM: Primary | ICD-10-CM

## 2024-05-29 DIAGNOSIS — Z87.19 HISTORY OF IBS: ICD-10-CM

## 2024-05-29 DIAGNOSIS — Z30.41 VISIT FOR BIRTH CONTROL PILLS MAINTENANCE: ICD-10-CM

## 2024-05-29 DIAGNOSIS — F41.9 ANXIETY AND DEPRESSION: ICD-10-CM

## 2024-05-29 PROCEDURE — 2014F MENTAL STATUS ASSESS: CPT | Performed by: NURSE PRACTITIONER

## 2024-05-29 PROCEDURE — 1126F AMNT PAIN NOTED NONE PRSNT: CPT | Performed by: NURSE PRACTITIONER

## 2024-05-29 PROCEDURE — 99395 PREV VISIT EST AGE 18-39: CPT | Performed by: NURSE PRACTITIONER

## 2024-05-29 RX ORDER — NORGESTIMATE AND ETHINYL ESTRADIOL 7DAYSX3 28
1 KIT ORAL DAILY
Qty: 84 TABLET | Refills: 1 | Status: SHIPPED | OUTPATIENT
Start: 2024-05-29

## 2024-05-29 NOTE — PROGRESS NOTES
Annual Physical     Name: Shaniqua Oliveira    : 1995     MRN: [id number]     Chief Complaint  Depression, Anxiety, and Annual Exam    Subjective     History of Present Illness:  Shaniqua Oliveira is a 28 y.o. female who presents today for annual physical exam.  Patient follows with Mimi for primary care.  Patient is moving to [place] this coming Saturday.  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  She wanted to get in today prior to her Medicaid running out.  She also needs refills on her Tri-Sprintec.  No complaints or concerns at this time.  Pleasant visit with patient today.      The patient is being seen for a health maintenance evaluation.    Past Medical History:   Diagnosis Date    Anxiety 2019    Depression 2013    Irritable bowel syndrome        Past Surgical History:   Procedure Laterality Date    COLONOSCOPY      UPPER GASTROINTESTINAL ENDOSCOPY      WISDOM TOOTH EXTRACTION         Social History     Socioeconomic History    Marital status: Single   Tobacco Use    Smoking status: Never    Smokeless tobacco: Never   Vaping Use    Vaping status: Never Used   Substance and Sexual Activity    Alcohol use: Yes     Comment: occ    Drug use: Never    Sexual activity: Not Currently     Partners: Male     Birth control/protection: Condom, Pill     Comment: Oral birth control         Current Outpatient Medications:     Efinaconazole (Jublia) 10 % solution, APPLY TOPICALLY ONCE DAILY, Disp: 8 mL, Rfl: 1    hydrOXYzine (ATARAX) 10 MG tablet, Take 1 tablet by mouth Every 6 (Six) Hours As Needed for Anxiety., Disp: 120 tablet, Rfl: 3    norgestimate-ethinyl estradiol (Tri-Sprintec) 0.18/0.215/0.25 MG-35 MCG per tablet, Take 1 tablet by mouth Daily., Disp: 84 tablet, Rfl: 1    pantoprazole (PROTONIX) 40 MG EC tablet, Take 1 tablet by mouth 2 (Two) Times a Day., Disp: 60 tablet, Rfl: 5    General History  Shaniqua  does have regular dental visits.  She does not complain of vision  "problems. Last eye exam was 2-3 months ago.  Immunizations are up to date. The patient needs the following immunizations: None at this time.    Lifestyle  Shaniqua  consumes in general, a \"healthy\" diet  , lactose intolerance .  She exercises daily and long walks with dog daily .    Reproductive Health  Shaniqua  is premenopausal.  She reports periods are regular every 28-30 days.  She is not sexually active. Her contraceptive plan is abstinence.    Screening  Last pap was August 2023 and was normal.  Last Completed Pap Smear            PAP SMEAR (Every 3 Years) Next due on 8/1/2026 08/01/2023  LIQUID-BASED PAP SMEAR WITH HPV GENOTYPING REGARDLESS OF INTERPRETATION (OMA,COR,MAD)                . History of abnormal pap smear or family history of gyn cancer: No abnormal pap. No FH.  Last mammogram was August 2023.  Last Completed Mammogram       This patient has no relevant Health Maintenance data.        . Personal or family history of abnormal mammograms or breast cancer: No FH.  Last colonoscopy was when she was in HS. Dx with IBS.  Last Completed Colonoscopy       This patient has no relevant Health Maintenance data.        . Family history of colon cancer: No FH.  Last DEXA was never.    Health Maintenance Summary            INFLUENZA VACCINE (Yearly - August to March) Next due on 8/1/2024 09/05/2023  Imm Admin: Fluzone (or Fluarix & Flulaval for VFC) >6mos    12/04/2022  Imm Admin: Flu Vaccine Split Quad    12/04/2022  Imm Admin: Flublok 18+yrs    11/26/2021  Imm Admin: Flu Vaccine Quad PF >36MO    11/26/2021  Imm Admin: FluLaval/Fluzone >6mos    Only the first 5 history entries have been loaded, but more history exists.              BMI FOLLOWUP (Yearly) Next due on 12/4/2024 12/04/2023  Registry Metric: BMI Follow-up    05/04/2023  SmartData: WORKFLOW - QUALITY MEASUREMENT - BMI FOLLOW UP CARE PLAN DOCUMENTED    05/04/2023  SmartData: WORKFLOW - QUALITY MEASUREMENT - DOCUMENTED WEIGHT FOLLOW-UP " PLAN    04/12/2023  SmartData: WORKFLOW - QUALITY MEASUREMENT - DOCUMENTED WEIGHT FOLLOW-UP PLAN              ANNUAL PHYSICAL (Yearly) Next due on 5/29/2025 05/29/2024  Done    05/04/2023  Done              PAP SMEAR (Every 3 Years) Next due on 8/1/2026 08/01/2023  LIQUID-BASED PAP SMEAR WITH HPV GENOTYPING REGARDLESS OF INTERPRETATION (OMA,COR,MAD)              TDAP/TD VACCINES (3 - Td or Tdap) Next due on 7/14/2032 07/14/2022  Imm Admin: Tdap    04/06/2012  Imm Admin: Tdap              HEPATITIS C SCREENING  Completed      01/21/2022  Hep C Virus Ab component of Hepatitis C Antibody              HPV VACCINES (Series Information) Completed      05/04/2023  Imm Admin: Hpv9    03/10/2021  Imm Admin: Hpv9    10/24/2020  Imm Admin: Hpv9              Pneumococcal Vaccine 0-64 (Series Information) Aged Out      No completion, postpone, or frequency change history exists for this topic.              Discontinued - COVID-19 Vaccine  Discontinued      11/01/2023  Frequency changed to Never by Mimi Pennington APRN (declined today)    04/12/2023  Imm Admin: COVID-19 (PFIZER) BIVALENT 12+YRS    11/26/2021  Imm Admin: COVID-19 (PFIZER) Purple Cap Monovalent    01/21/2021  Imm Admin: COVID-19 (PFIZER) Purple Cap Monovalent    01/01/2021  Imm Admin: COVID-19 (PFIZER) Purple Cap Monovalent    Only the first 5 history entries have been loaded, but more history exists.              Discontinued - CHLAMYDIA SCREENING  Discontinued        Frequency changed to Never automatically (Topic No Longer Applies)    08/01/2023  LIQUID-BASED PAP SMEAR WITH HPV GENOTYPING REGARDLESS OF INTERPRETATION (OMA,COR,MAD)                  Immunization History   Administered Date(s) Administered    COVID-19 (PFIZER) BIVALENT 12+YRS 04/12/2023    COVID-19 (PFIZER) Purple Cap Monovalent 01/01/2021, 01/21/2021, 11/26/2021    DTP / HiB 1995, 01/26/1998, 10/01/1998    DTaP, Unspecified 06/18/2002    Flu Vaccine Quad PF >36MO  "01/24/2019, 11/26/2021    Flu Vaccine Split Quad 12/04/2022    Flublok 18+yrs 12/04/2022    Fluzone (or Fluarix & Flulaval for VFC) >6mos 01/24/2019, 11/26/2021, 09/05/2023    Hep B, Adolescent or Pediatric 1995, 07/07/1998    Hpv9 10/24/2020, 03/10/2021, 05/04/2023    IPV 1995, 06/18/2002    MCV4 Unspecified 06/01/2015    MMR 10/01/1998, 06/18/2002    Meningococcal MCV4P (Menactra) 06/01/2015    OPV 10/01/1998    Tdap 04/06/2012, 07/14/2022    Varicella 06/18/2002, 06/01/2015       Review of Systems   Constitutional: Negative.    HENT: Negative.     Respiratory: Negative.     Cardiovascular: Negative.    Gastrointestinal: Negative.    Genitourinary: Negative.    Musculoskeletal: Negative.    Neurological: Negative.    Psychiatric/Behavioral: Negative.         Objective     Vital Signs  /72   Pulse 61   Ht 157.5 cm (62\")   Wt 73.3 kg (161 lb 9.6 oz)   SpO2 98%   BMI 29.56 kg/m²   Estimated body mass index is 29.56 kg/m² as calculated from the following:    Height as of this encounter: 157.5 cm (62\").    Weight as of this encounter: 73.3 kg (161 lb 9.6 oz).            Physical Exam  Constitutional:       General: She is awake. She is not in acute distress.     Appearance: Normal appearance. She is well-developed and well-groomed. She is not ill-appearing.   HENT:      Head: Normocephalic and atraumatic.      Right Ear: Tympanic membrane, ear canal and external ear normal.      Left Ear: Tympanic membrane, ear canal and external ear normal.      Nose: Nose normal.      Mouth/Throat:      Mouth: Mucous membranes are moist.      Pharynx: Oropharynx is clear.   Eyes:      General: No scleral icterus.     Extraocular Movements: Extraocular movements intact.      Conjunctiva/sclera: Conjunctivae normal.      Pupils: Pupils are equal, round, and reactive to light.   Neck:      Trachea: Trachea normal.   Cardiovascular:      Rate and Rhythm: Normal rate and regular rhythm.      Pulses: Normal pulses. "      Heart sounds: Normal heart sounds. No murmur heard.  Pulmonary:      Effort: Pulmonary effort is normal. No tachypnea, accessory muscle usage or respiratory distress.      Breath sounds: Normal breath sounds. No wheezing, rhonchi or rales.   Abdominal:      General: Abdomen is flat. Bowel sounds are normal. There is no distension.      Palpations: Abdomen is soft.      Tenderness: There is no abdominal tenderness.   Genitourinary:     Comments: Deferred  Musculoskeletal:         General: No swelling. Normal range of motion.      Cervical back: Normal range of motion and neck supple. No tenderness.      Right lower leg: No edema.      Left lower leg: No edema.   Skin:     General: Skin is warm and dry.      Capillary Refill: Capillary refill takes less than 2 seconds.      Coloration: Skin is not jaundiced or pale.      Findings: No lesion or rash.   Neurological:      General: No focal deficit present.      Mental Status: She is alert and oriented to person, place, and time. Mental status is at baseline.      Motor: No weakness.      Gait: Gait is intact.   Psychiatric:         Attention and Perception: Attention normal.         Mood and Affect: Mood normal.         Speech: Speech normal.         Behavior: Behavior normal. Behavior is cooperative.         Thought Content: Thought content normal.         Judgment: Judgment normal.          Assessment and Plan     Diagnoses and all orders for this visit:    1. Annual physical exam (Primary)    2. Anxiety and depression    3. History of IBS    4. Visit for birth control pills maintenance  -     norgestimate-ethinyl estradiol (Tri-Sprintec) 0.18/0.215/0.25 MG-35 MCG per tablet; Take 1 tablet by mouth Daily.  Dispense: 84 tablet; Refill: 1        Plan:  Annual physical exam.  Patient declines labs today.  Medication refill sent to the pharmacy on file.  Up-to-date on Pap smear.  Screening for breast cancer started age 42.  Screening for colon cancer to start at age  47.  Up-to-date on vaccinations.  Continue with up to date immunizations and health maintenance screenings.  Continue with healthy lifestyle choices such as healthy diet and eating habits and regular exercise routine.  Continue with adequate oral hydration and rest.  Annual physical exam in 1 year.    Follow Up  Return in about 1 year (around 5/29/2025) for Annual.    NICHOLAS Venegas    Part of this note may be an electronic transcription/translation of spoken language to printed text using the Dragon Dictation System.